# Patient Record
Sex: FEMALE | Race: WHITE | NOT HISPANIC OR LATINO | Employment: OTHER | URBAN - METROPOLITAN AREA
[De-identification: names, ages, dates, MRNs, and addresses within clinical notes are randomized per-mention and may not be internally consistent; named-entity substitution may affect disease eponyms.]

---

## 2017-02-17 ENCOUNTER — HOSPITAL ENCOUNTER (INPATIENT)
Facility: HOSPITAL | Age: 32
LOS: 4 days | Discharge: HOME | DRG: 885 | End: 2017-02-21
Attending: PSYCHIATRY & NEUROLOGY | Admitting: PSYCHIATRY & NEUROLOGY
Payer: MEDICARE

## 2017-02-17 ENCOUNTER — HOSPITAL ENCOUNTER (EMERGENCY)
Facility: HOSPITAL | Age: 32
Discharge: PSYCHIATRIC OR SUBSTANCE USE FACILITY | End: 2017-02-17
Attending: SURGERY
Payer: MEDICARE

## 2017-02-17 VITALS
WEIGHT: 95 LBS | HEART RATE: 82 BPM | BODY MASS INDEX: 19.19 KG/M2 | TEMPERATURE: 98 F | DIASTOLIC BLOOD PRESSURE: 63 MMHG | SYSTOLIC BLOOD PRESSURE: 114 MMHG | RESPIRATION RATE: 16 BRPM | OXYGEN SATURATION: 99 %

## 2017-02-17 DIAGNOSIS — F19.10 SUBSTANCE ABUSE: ICD-10-CM

## 2017-02-17 DIAGNOSIS — F29 PSYCHOSIS, UNSPECIFIED PSYCHOSIS TYPE: Primary | ICD-10-CM

## 2017-02-17 DIAGNOSIS — F41.9 ANXIETY: ICD-10-CM

## 2017-02-17 DIAGNOSIS — F29 PSYCHOSIS: ICD-10-CM

## 2017-02-17 LAB
ALBUMIN SERPL BCP-MCNC: 3.9 G/DL
ALP SERPL-CCNC: 107 U/L
ALT SERPL W/O P-5'-P-CCNC: 52 U/L
AMPHET+METHAMPHET UR QL: NORMAL
ANION GAP SERPL CALC-SCNC: 8 MMOL/L
APAP SERPL-MCNC: <3 UG/ML
AST SERPL-CCNC: 53 U/L
B-HCG UR QL: NEGATIVE
BACTERIA #/AREA URNS HPF: NORMAL /HPF
BARBITURATES UR QL SCN>200 NG/ML: NEGATIVE
BASOPHILS # BLD AUTO: 0.05 K/UL
BASOPHILS NFR BLD: 0.6 %
BENZODIAZ UR QL SCN>200 NG/ML: NEGATIVE
BILIRUB SERPL-MCNC: 0.6 MG/DL
BILIRUB UR QL STRIP: NEGATIVE
BUN SERPL-MCNC: 21 MG/DL
BZE UR QL SCN: NEGATIVE
CALCIUM SERPL-MCNC: 9.3 MG/DL
CANNABINOIDS UR QL SCN: NORMAL
CHLORIDE SERPL-SCNC: 105 MMOL/L
CLARITY UR: CLEAR
CO2 SERPL-SCNC: 26 MMOL/L
COLOR UR: YELLOW
CREAT SERPL-MCNC: 0.8 MG/DL
CREAT UR-MCNC: 31.5 MG/DL
DIFFERENTIAL METHOD: ABNORMAL
EOSINOPHIL # BLD AUTO: 0.4 K/UL
EOSINOPHIL NFR BLD: 4.6 %
ERYTHROCYTE [DISTWIDTH] IN BLOOD BY AUTOMATED COUNT: 13.3 %
EST. GFR  (AFRICAN AMERICAN): >60 ML/MIN/1.73 M^2
EST. GFR  (NON AFRICAN AMERICAN): >60 ML/MIN/1.73 M^2
ETHANOL SERPL-MCNC: <10 MG/DL
GLUCOSE SERPL-MCNC: 98 MG/DL
GLUCOSE UR QL STRIP: NEGATIVE
HCT VFR BLD AUTO: 37.1 %
HGB BLD-MCNC: 13 G/DL
HGB UR QL STRIP: ABNORMAL
KETONES UR QL STRIP: NEGATIVE
LEUKOCYTE ESTERASE UR QL STRIP: NEGATIVE
LYMPHOCYTES # BLD AUTO: 4.3 K/UL
LYMPHOCYTES NFR BLD: 50.1 %
MCH RBC QN AUTO: 31.1 PG
MCHC RBC AUTO-ENTMCNC: 35 %
MCV RBC AUTO: 89 FL
METHADONE UR QL SCN>300 NG/ML: NEGATIVE
MICROSCOPIC COMMENT: NORMAL
MONOCYTES # BLD AUTO: 1 K/UL
MONOCYTES NFR BLD: 11.6 %
NEUTROPHILS # BLD AUTO: 2.9 K/UL
NEUTROPHILS NFR BLD: 33.1 %
NITRITE UR QL STRIP: NEGATIVE
OPIATES UR QL SCN: NEGATIVE
PCP UR QL SCN>25 NG/ML: NEGATIVE
PH UR STRIP: 7 [PH] (ref 5–8)
PLATELET # BLD AUTO: 501 K/UL
PMV BLD AUTO: 9.3 FL
POTASSIUM SERPL-SCNC: 3.9 MMOL/L
PROT SERPL-MCNC: 7.6 G/DL
PROT UR QL STRIP: NEGATIVE
RBC # BLD AUTO: 4.18 M/UL
RBC #/AREA URNS HPF: 1 /HPF (ref 0–4)
SALICYLATES SERPL-MCNC: <5 MG/DL
SODIUM SERPL-SCNC: 139 MMOL/L
SP GR UR STRIP: <=1.005 (ref 1–1.03)
SQUAMOUS #/AREA URNS HPF: 3 /HPF
TOXICOLOGY INFORMATION: NORMAL
TSH SERPL DL<=0.005 MIU/L-ACNC: 0.83 UIU/ML
URN SPEC COLLECT METH UR: ABNORMAL
UROBILINOGEN UR STRIP-ACNC: NEGATIVE EU/DL
WBC # BLD AUTO: 8.61 K/UL
WBC #/AREA URNS HPF: 1 /HPF (ref 0–5)

## 2017-02-17 PROCEDURE — 27000339 *HC DAILY SUPPLY KIT

## 2017-02-17 PROCEDURE — 80329 ANALGESICS NON-OPIOID 1 OR 2: CPT

## 2017-02-17 PROCEDURE — 84443 ASSAY THYROID STIM HORMONE: CPT

## 2017-02-17 PROCEDURE — 82570 ASSAY OF URINE CREATININE: CPT

## 2017-02-17 PROCEDURE — 81000 URINALYSIS NONAUTO W/SCOPE: CPT

## 2017-02-17 PROCEDURE — 99285 EMERGENCY DEPT VISIT HI MDM: CPT

## 2017-02-17 PROCEDURE — 80307 DRUG TEST PRSMV CHEM ANLYZR: CPT | Mod: 59

## 2017-02-17 PROCEDURE — 36415 COLL VENOUS BLD VENIPUNCTURE: CPT

## 2017-02-17 PROCEDURE — 11400000 HC PSYCH PRIVATE ROOM

## 2017-02-17 PROCEDURE — 80053 COMPREHEN METABOLIC PANEL: CPT

## 2017-02-17 PROCEDURE — 80320 DRUG SCREEN QUANTALCOHOLS: CPT

## 2017-02-17 PROCEDURE — 85025 COMPLETE CBC W/AUTO DIFF WBC: CPT

## 2017-02-17 PROCEDURE — 80061 LIPID PANEL: CPT

## 2017-02-17 PROCEDURE — 81025 URINE PREGNANCY TEST: CPT

## 2017-02-17 PROCEDURE — 25000003 PHARM REV CODE 250: Performed by: PSYCHIATRY & NEUROLOGY

## 2017-02-17 RX ORDER — HYDROXYZINE PAMOATE 50 MG/1
50 CAPSULE ORAL EVERY 6 HOURS PRN
Status: DISCONTINUED | OUTPATIENT
Start: 2017-02-17 | End: 2017-02-21 | Stop reason: HOSPADM

## 2017-02-17 RX ORDER — QUETIAPINE FUMARATE 200 MG/1
400 TABLET, FILM COATED ORAL NIGHTLY
Status: DISCONTINUED | OUTPATIENT
Start: 2017-02-17 | End: 2017-02-21 | Stop reason: HOSPADM

## 2017-02-17 RX ORDER — LOPERAMIDE HYDROCHLORIDE 2 MG/1
2 CAPSULE ORAL
Status: DISCONTINUED | OUTPATIENT
Start: 2017-02-17 | End: 2017-02-21 | Stop reason: HOSPADM

## 2017-02-17 RX ORDER — ACETAMINOPHEN 325 MG/1
650 TABLET ORAL EVERY 6 HOURS PRN
Status: DISCONTINUED | OUTPATIENT
Start: 2017-02-17 | End: 2017-02-21 | Stop reason: HOSPADM

## 2017-02-17 RX ORDER — DOCUSATE SODIUM 100 MG/1
100 CAPSULE, LIQUID FILLED ORAL DAILY PRN
Status: DISCONTINUED | OUTPATIENT
Start: 2017-02-17 | End: 2017-02-21 | Stop reason: HOSPADM

## 2017-02-17 RX ORDER — MAG HYDROX/ALUMINUM HYD/SIMETH 200-200-20
30 SUSPENSION, ORAL (FINAL DOSE FORM) ORAL EVERY 6 HOURS PRN
Status: DISCONTINUED | OUTPATIENT
Start: 2017-02-17 | End: 2017-02-21 | Stop reason: HOSPADM

## 2017-02-17 RX ORDER — IBUPROFEN 200 MG
1 TABLET ORAL DAILY PRN
Status: DISCONTINUED | OUTPATIENT
Start: 2017-02-17 | End: 2017-02-21 | Stop reason: HOSPADM

## 2017-02-17 RX ORDER — OLANZAPINE 10 MG/1
10 TABLET ORAL EVERY 4 HOURS PRN
Status: DISCONTINUED | OUTPATIENT
Start: 2017-02-17 | End: 2017-02-21 | Stop reason: HOSPADM

## 2017-02-17 RX ORDER — OLANZAPINE 10 MG/2ML
10 INJECTION, POWDER, FOR SOLUTION INTRAMUSCULAR EVERY 4 HOURS PRN
Status: DISCONTINUED | OUTPATIENT
Start: 2017-02-17 | End: 2017-02-21 | Stop reason: HOSPADM

## 2017-02-17 RX ADMIN — QUETIAPINE FUMARATE 400 MG: 200 TABLET, FILM COATED ORAL at 08:02

## 2017-02-17 NOTE — ED NOTES
Transferred to [facility] via wheelchair w [facility] staff and security, condition stable, NAD.

## 2017-02-17 NOTE — PROGRESS NOTES
Staff was unable to start the Recreation Therapy Assessment at this time. Patient presents with manic behavior, hyperverbal and animated. Patient is unable to answer questions appropriately. Staff will try again when the patient is less manic.

## 2017-02-17 NOTE — IP AVS SNAPSHOT
[address] [address]  Phone: [phone]           Patient Discharge Instructions     Our goal is to set you up for success. This packet includes information on your condition, medications, and your home care. It will help you to care for yourself so you don't get sicker and need to go back to the hospital.     Please ask your nurse if you have any questions.        There are many details to remember when preparing to leave the hospital. Here is what you will need to do:    1. Take your medicine. If you are prescribed medications, review your Medication List in the following pages. You may have new medications to  at the pharmacy and others that you'll need to stop taking. Review the instructions for how and when to take your medications. Talk with your doctor or nurses if you are unsure of what to do.     2. Go to your follow-up appointments. Specific follow-up information is listed in the following pages. Your may be contacted by a transition nurse or clinical provider about future appointments. Be sure we have all of the phone numbers to reach you, if needed. Please contact your provider's office if you are unable to make an appointment.     3. Watch for warning signs. Your doctor or nurse will give you detailed warning signs to watch for and when to call for assistance. These instructions may also include educational information about your condition. If you experience any of warning signs to your health, call your doctor.               ** Verify the list of medication(s) below is accurate and up to date. Carry this with you in case of emergency. If your medications have changed, please notify your healthcare provider.             Medication List      CONTINUE taking these medications        Additional Info                      quetiapine 400 MG tablet   Commonly known as:  SEROQUEL   Quantity:  30 tablet   Refills:  1   Dose:  400 mg    Last time this was  "given:  400 mg on 2/20/2017  8:42 PM   Instructions:  Take 1 tablet (400 mg total) by mouth every evening.     Begin Date    AM    Noon    PM    Bedtime         STOP taking these medications     clonazePAM 1 MG tablet   Commonly known as:  KLONOPIN       gabapentin 300 MG capsule   Commonly known as:  NEURONTIN       hydrocodone-acetaminophen   mg per tablet   Commonly known as:  LORTAB       naproxen 500 MG tablet   Commonly known as:  NAPROSYN            Where to Get Your Medications      You can get these medications from any pharmacy     Bring a paper prescription for each of these medications     quetiapine 400 MG tablet                  Please bring to all follow up appointments:    1. A copy of your discharge instructions.  2. All medicines you are currently taking in their original bottles.  3. Identification and insurance card.    Please arrive 15 minutes ahead of scheduled appointment time.    Please call 24 hours in advance if you must reschedule your appointment and/or time.        Follow-up Information     Follow up with [facility] On 3/9/2017.    Specialties:  Psychology, Psychiatry, Behavioral Health    Why:  Outpatient Psych Services, as scheduled at     Contact information:    [address] [facility] [address]  [phone]          Discharge References/Attachments     ADDICTION, RECOVERING (ENGLISH)        Primary Diagnosis     Your primary diagnosis was:  Drug Dependence      Admission Information     Date & Time Department CSN    2/17/2017  1:37 PM [facility] [id number]       Admisson Diagnosis: Psychosis      Care Providers     Not on file      Your Vitals Were     BP Pulse Temp Resp Height Weight    105/79 (BP Location: Left arm, Patient Position: Sitting, BP Method: Automatic) 70 98.1 °F (36.7 °C) (Oral) 18 4' 11" (1.499 m) 41.3 kg (91 lb)    Last Period BMI             02/17/2017 18.38 kg/m2         Recent Lab Values     No lab values to " display.      Blood Glucose and Lipid Panel Labs        2/17/2017                          11:25 AM           Cholesterol 169           Triglycerides 36           HDL Cholesterol 66           LDL Cholesterol 95.8           HDL/Cholesterol Ratio 39.1           Total Cholesterol/HDL Ratio 2.6           Non-HDL Cholesterol 103           Comment for Cholesterol at 11:25 AM on 2/17/2017:  The National Cholesterol Education Program (NCEP) has set the  following guidelines (reference ranges) for Cholesterol:  Optimal.....................<200 mg/dL  Borderline High.............200-239 mg/dL  High........................> or = 240 mg/dL      Comment for Triglycerides at 11:25 AM on 2/17/2017:  The National Cholesterol Education Program (NCEP) has set the  following guidelines (reference values) for triglycerides:  Normal......................<150 mg/dL  Borderline High.............150-199 mg/dL  High........................200-499 mg/dL      Comment for HDL Cholesterol at 11:25 AM on 2/17/2017:  The National Cholesterol Education Program (NCEP) has set the  following guidelines (reference values) for HDL Cholesterol:  Low...............<40 mg/dL  Optimal...........>60 mg/dL      Comment for LDL Cholesterol at 11:25 AM on 2/17/2017:  The National Cholesterol Education Program (NCEP) has set the  following guidelines (reference values) for LDL Cholesterol:  Optimal.......................<130 mg/dL  Borderline High...............130-159 mg/dL  High..........................160-189 mg/dL  Very High.....................>190 mg/dL      Comment for Non-HDL Cholesterol at 11:25 AM on 2/17/2017:  Risk category and Non-HDL cholesterol goals:  Coronary heart disease (CHD)or equivalent (10-year risk of CHD >20%):  Non-HDL cholesterol goal     <130 mg/dL  Two or more CHD risk factors and 10-year risk of CHD <= 20%:  Non-HDL cholesterol goal     <160 mg/dL  0 to 1 CHD risk factor:  Non-HDL cholesterol goal     <190 mg/dL        Allergies  as of 2/21/2017     No Known Allergies      Advance Directives     An advance directive is a document which, in the event you are no longer able to make decisions for yourself, tells your healthcare team what kind of treatment you do or do not want to receive, or who you would like to make those decisions for you.  If you do not currently have an advance directive, [facility] encourages you to create one.  For more information call:  [phone]), [phone] ([phone]),  or log on to [email or web address].        Advance Directive Status          Most Recent Value    Advance Directive Status  Patient does not have Advance Directive, declines information.      Smoking Cessation     If you would like to quit smoking:   You may be eligible for free services if you are a [place] resident and started smoking cigarettes before September 1, 1988.  Call the Smoking Cessation Trust (SCT) toll free at (947) 783-5754 or (476) 142-3550.   Call 6-505-QUIT-NOW if you do not meet the above criteria.            Language Assistance Services     ATTENTION: Language assistance services are available, free of charge. Please call [phone].      ATENCIÓN: Si habla español, tiene a villa disposición servicios gratuitos de asistencia lingüística. Llame al [phone].     CHÚ Ý: N?u b?n nói Ti?ng Vi?t, có các d?ch v? h? tr? ngôn ng? mi?n phí dành cho b?n. G?i s? [phone].        National Suicide Prevention Lifeline     If you or someone you know is thinking about suicide, call the National Suicide Prevention Lifeline.  National Suicide Hotline: 7-962-256-TALK (6612)  The lifeline is free, confidential and always available.  Help a loved one, a friend or yourself.  www.suicideprevenetionlifeline.org          [facility] Sign-Up     Activating your [facility] account is as easy as 1-2-3!     1) Visit [email or web address], select Sign Up Now, enter this activation code and your date of birth, then select  Next.  [email or web address]  Expires: 2/22/2017  7:38 PM      2) Create a username and password to use when you visit [facility] in the future and select a security question in case you lose your password and select Next.    3) Enter your e-mail address and click Sign Up!    Additional Information  If you have questions, please e-mail [email or web address] or call [phone] to talk to our [facility] staff. Remember, [facility] is NOT to be used for urgent needs. For medical emergencies, dial 911.          [facility] complies with applicable Federal civil rights laws and does not discriminate on the basis of race, color, national origin, age, disability, or sex.

## 2017-02-17 NOTE — PLAN OF CARE
Problem: Patient Care Overview (Adult)  Goal: Plan of Care Review  Outcome: Ongoing (interventions implemented as appropriate)  Nutrition Recommendation/Intervention:   Continue Regular diet as tolerated encouraging good intake     Goals: adequate po intake >=75%  Nutrition Goal Status: new  Communication of RD Recs: other (comment) (note)
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
## 2017-02-17 NOTE — ED PROVIDER NOTES
"[facility]                                                         Chief Complaint  32 y.o. female with Mental Health Problem    History of Present Illness  Izabella Ellis presents to the emergency room accompanied by police because of an OPC filled out by   her mother.  Her mother states that she is demonstrating bizarre behavior.  She is throwing furniture outside.   Patient admits that she has anxiety and bipolar disorder.  She states that she put the bed frame outside so   that no one would trash the house.  She also wants everyone to leave her house because she is tired of   supporting them.  She denies pain.  Denies fever or chills.  No nausea or vomiting.  No muscle aches.    She states she is on no meds.  She states she has never been evaluated.  She denies drug use.  However   when she was later presented with the results of her UDS, she admitedt she did smoke some marijuana and   also had mainlined "something."  Denies homicidal or suicidal ideation.      Past Medical History   Diagnosis Date    Anxiety     Bipolar 1 disorder     Depression     Polycystic ovaries     Post traumatic stress disorder due to war, terrorism, or hostility      Past Surgical History   Procedure Laterality Date    Neck surgery      Back surgery      Face reconstruction      Splenectomy, total       section      Tracheostomy tube placement        Review of patient's allergies indicates:  No Known Allergies     Review of Systems and Physical Exam     Review of Systems  -- Constitution - no fever, denies fatigue, no weakness, no chills  -- Eyes - no tearing or redness, no visual disturbance  -- Ear, Nose - no tinnitus or earache, no nasal congestion or discharge  -- Mouth,Throat - no sore throat, no toothache, normal voice, normal swallowing  -- Respiratory - denies cough and congestion, no shortness of breath, no CHAVEZ  -- Cardiovascular - denies chest pain, no palpitations, denies " claudication  -- Gastrointestinal - denies abdominal pain, nausea, vomiting, or diarrhea  -- Genitourinary - no dysuria, no denies flank pain, no hematuria or frequency   -- Musculoskeletal - denies back pain, negative for myalgias and arthralgias   -- Neurological - no headache, denies weakness or seizure; no LOC  -- Skin - denies pallor, rash, or changes in skin. no hives or welts noted    Vital Signs   weight is 43.1 kg (95 lb). Her oral temperature is 98 °F (36.7 °C). Her blood pressure is 100/71 and her pulse is 94. Her respiration is 18 and oxygen saturation is 99%.      Physical Exam  -- Nursing note and vitals reviewed  -- Constitutional: Appears well-developed and well-nourished  -- Head: Atraumatic. Normocephalic. No obvious abnormality  -- Eyes: Pupils are equal and reactive to light. Normal conjunctiva and lids  -- Nose: Nose normal in appearance, nares grossly normal. No discharge  -- Throat: Mucous membranes moist, pharynx normal, normal tonsils. No lesions   -- Ears: External ears and TM normal bilaterally. Normal hearing and no drainage  -- Neck: Normal range of motion. Neck supple. No masses, trachea midline  -- Cardiac: Normal rate, regular rhythm and normal heart sounds  -- Pulmonary: Normal respiratory effort, breath sounds clear to auscultation  -- Abdominal: Soft, no tenderness. Normal bowel sounds. Normal liver edge  -- Musculoskeletal: Normal range of motion, no effusions. Joints stable   -- Neurological: No focal deficits. Showed good interaction with staff  -- Vascular: Posterior tibial, dorsalis pedis and radial pulses 2+ bilaterally    -- Lymphatics: No cervical or peripheral lymphadenopathy. No edema noted  -- Skin: Warm and dry. No evidence of rash or cellulitis.  She has healing scabs  on her face which she has medicine for but has been unable to buy it because   she is supporting everybody else.  -- Psychiatric: Agitated.  States she has never been evaluated.  Worried about her dog.       Emergency Room Course     Lab values  Labs Reviewed   CBC W/ AUTO DIFFERENTIAL - Abnormal; Notable for the following:        Result Value    MCH 31.1 (*)     Platelets 501 (*)     Gran% 33.1 (*)     Lymph% 50.1 (*)     All other components within normal limits   COMPREHENSIVE METABOLIC PANEL - Abnormal; Notable for the following:     BUN, Bld 21 (*)     AST 53 (*)     ALT 52 (*)     All other components within normal limits   URINALYSIS - Abnormal; Notable for the following:     Specific Gravity, UA <=1.005 (*)     Occult Blood UA 3+ (*)     All other components within normal limits   ACETAMINOPHEN LEVEL - Abnormal; Notable for the following:     Acetaminophen (Tylenol), Serum <3.0 (*)     All other components within normal limits   SALICYLATE LEVEL - Abnormal; Notable for the following:     Salicylate Lvl <5.0 (*)     All other components within normal limits   TSH   DRUG SCREEN PANEL, URINE EMERGENCY   ALCOHOL,MEDICAL (ETHANOL)   PREGNANCY TEST, URINE RAPID   URINALYSIS MICROSCOPIC       Medications Given  Medications - No data to display    ED Management  Medically cleared for Psychiatric evaluation and treatment    Diagnosis  -- The primary encounter diagnosis was Psychosis, unspecified psychosis type. Diagnoses of Substance abuse and Anxiety were also pertinent to this visit.    Disposition and Plan  -- Disposition: Transfer for psychiatric evaluation and treatment.  -- Condition: stable         Ceci Cortez MD  02/17/17 [id number]

## 2017-02-17 NOTE — CONSULTS
[facility]  Adult Nutrition  Consult Note    SUMMARY     Recommendations    Recommendation/Intervention: Continue Regular diet as tolerated encouraging good intake  Goals: adequate po intake >=75%  Nutrition Goal Status: new  Communication of RD Recs: other (comment) (note)    Continuum of Care Plan    Referral to Outpatient Services: behavioral health clinic    Reason for Assessment    Reason for Assessment: physician consult  Diagnosis: other (see comments) (Psyc)  Relevent Medical History: All Psyc         General Information Comments: pt soker, recovering addict; multiple scabs on body    Nutrition Prescription Ordered    Current Diet Order: Regular  Nutrition Order Comments: adequate      Evaluation of Received Nutrients/Fluid Intake  Tolerance: tolerating     Nutrition Risk Screen     Nutrition Risk Screen: no indicators present    Nutrition/Diet History  Factors Affecting Nutritional Intake: socio-economic (drug use)    Labs/Tests/Procedures/Meds       Pertinent Labs Reviewed: reviewed  Pertinent Labs Comments: BUN 21H, AST 53H, ALT 52H  Pertinent Medications Reviewed: reviewed  Pertinent Medications Comments: MVI    Physical Findings    Overall Physical Appearance:  (unable to obtain)  Tubes:  (N/A)  Oral/Mouth Cavity:  (Unable to obtain)  Skin: other (see comments) (scabs on body)    Anthropometrics       Height (inches): 59.02 in  Weight Method: Standard Scale  Weight (kg): 40.4 kg     Ideal Body Weight (IBW), Female: 95.1 lb     % Ideal Body Weight, Female (lb): 93.66 lb  BMI (kg/m2): 17.98  BMI Grade: 17 - 18.4 protein-energy malnutrition grade I    Estimated/Assessed Needs    Weight Used For Calorie Calculations: 43 kg (94 lb 12.8 oz) (IBW)   Height (cm): 149.9 cm     Energy Need Method: Kcal/kg     25 kcal/kg (kcal): 1075 and 30 kcal/kg (kcal): 1290   RMR (Elk-St. Jeor Equation): 1026.95        Weight Used For Protein Calculations: 43 kg (94 lb 12.8 oz) (IBW)  Protein  Requirements: 35-43 (0.8-1.0)  0.8 gm Protein (gm): 34.47 and 1.0 gm Protein (gm): 43.09  Fluid Need Method: RDA Method 1ml/kcal    Nutrition Diagnosis    No nutritional diagnosis at this time    Monitor and Evaluation    Food and Nutrient Intake: food and beverage intake  Food and Nutrient Adminstration: diet order  Knowledge/Beliefs/Attitudes: food and nutrition knowledge/skill, beliefs and attitudes  Physical Activity and Function: nutrition-related ADLs and IADLs  Anthropometric Measurements: weight, weight change  Biochemical Data, Medical Tests and Procedures: electrolyte and renal panel  Nutrition-Focused Physical Findings: overall appearance    Nutrition Risk    Level of Risk: low    Nutrition Follow-Up    RD Follow-up?: Yes 02/21/2017    Assessment and Plan    No new Assessment & Plan notes have been filed under this hospital service since the last note was generated.  Service: Nutrition

## 2017-02-18 PROCEDURE — 25000003 PHARM REV CODE 250: Performed by: PSYCHIATRY & NEUROLOGY

## 2017-02-18 PROCEDURE — 90833 PSYTX W PT W E/M 30 MIN: CPT | Mod: ,,, | Performed by: PSYCHIATRY & NEUROLOGY

## 2017-02-18 PROCEDURE — 99223 1ST HOSP IP/OBS HIGH 75: CPT | Mod: ,,, | Performed by: PSYCHIATRY & NEUROLOGY

## 2017-02-18 PROCEDURE — 99221 1ST HOSP IP/OBS SF/LOW 40: CPT | Mod: ,,, | Performed by: FAMILY MEDICINE

## 2017-02-18 PROCEDURE — 27000339 *HC DAILY SUPPLY KIT

## 2017-02-18 PROCEDURE — 11400000 HC PSYCH PRIVATE ROOM

## 2017-02-18 RX ADMIN — THERA TABS 1 TABLET: TAB at 08:02

## 2017-02-18 RX ADMIN — QUETIAPINE FUMARATE 400 MG: 200 TABLET, FILM COATED ORAL at 08:02
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
## 2017-02-18 NOTE — CONSULTS
History & Physical      SUBJECTIVE:     History of Present Illness:  Patient is a 32 y.o. female presents with depression and substance abuse. Admitted to [facility].    No past medical history other than psych. No complaints today.    PTA Medications   Medication Sig    quetiapine (SEROQUEL) 400 MG tablet Take 400 mg by mouth every evening.    [DISCONTINUED] clonazePAM (KLONOPIN) 1 MG tablet Take 1 mg by mouth 2 (two) times daily as needed for Anxiety.    [DISCONTINUED] gabapentin (NEURONTIN) 300 MG capsule Take 1 capsule (300 mg total) by mouth 3 (three) times daily.    [DISCONTINUED] hydrocodone-acetaminophen (LORTAB)  mg per tablet Take 1 tablet by mouth every 6 (six) hours as needed.      [DISCONTINUED] naproxen (NAPROSYN) 500 MG tablet Take 1 tablet (500 mg total) by mouth 2 (two) times daily with meals.       Review of patient's allergies indicates:  No Known Allergies    Past Medical History   Diagnosis Date    Anxiety     Bipolar 1 disorder     Depression     Hx of psychiatric care     Marisel     Polycystic ovaries     Post traumatic stress disorder due to war, terrorism, or hostility      Past Surgical History   Procedure Laterality Date    Neck surgery      Back surgery      Face reconstruction      Splenectomy, total       section      Tracheostomy tube placement       Family History   Problem Relation Age of Onset    Anxiety disorder Mother     Depression Mother     Depression Maternal Grandmother     Anxiety disorder Maternal Grandmother     Depression Sister     Anxiety disorder Sister      Social History   Substance Use Topics    Smoking status: Current Every Day Smoker     Packs/day: 0.50     Types: Cigarettes    Smokeless tobacco: Never Used      Comment: not interested    Alcohol use No        Review of Systems:  Constitutional: no fever or chills  Respiratory: no cough or shorness of breath  Cardiovascular: no chest pain or palpitations  Gastrointestinal: no  nausea or vomiting, no abdominal pain or change in bowel habits  Musculoskeletal: no arthralgias or myalgias    OBJECTIVE:     Vital Signs (Most Recent)  Temp: 98.7 °F (37.1 °C) (02/18/17 1500)  Pulse: 77 (02/18/17 1500)  Resp: 18 (02/18/17 1500)  BP: (!) 114/54 (02/18/17 1500)    Physical Exam:  General: well developed, well nourished  Lungs:  clear to auscultation bilaterally and normal respiratory effort  Cardiovascular: Heart: regular rate and rhythm, S1, S2 normal, no murmur, click, rub or gallop. Chest Wall: no tenderness. Extremities: no cyanosis or edema, or clubbing. Pulses: 2+ and symmetric.  Abdomen/Rectal: Abdomen: soft, non-tender non-distented; bowel sounds normal; no masses,  no organomegaly. Rectal: not examined  Skin: Skin color, texture, turgor normal. No rashes or lesions  Musculoskeletal:normal gait  Psych:   Neuro: Cranial nerves:  CN II Visual fields full to confrontation.   CN III, IV, VI Pupils are equal, round, and reactive to light.  CN III: no palsy  Nystagmus: none   Diplopia: none  Ophthalmoparesis: none  CN V Facial sensation intact.   CN VII Facial expression full, symmetric.   CN VIII normal.   CN IX normal.   CN X normal.   CN XI normal.   CN XII normal.      Laboratory  CBC:   Recent Labs  Lab 02/17/17  1127   WBC 8.61   RBC 4.18   HGB 13.0   HCT 37.1   *   MCV 89   MCH 31.1*   MCHC 35.0     CMP:   Recent Labs  Lab 02/17/17  1127   GLU 98   CALCIUM 9.3   ALBUMIN 3.9   PROT 7.6      K 3.9   CO2 26      BUN 21*   CREATININE 0.8   ALKPHOS 107   ALT 52*   AST 53*   BILITOT 0.6       Recent Labs  Lab 02/17/17  1115   COLORU Yellow   SPECGRAV <=1.005*   PHUR 7.0   PROTEINUA Negative   BACTERIA Rare   NITRITE Negative   LEUKOCYTESUR Negative   UROBILINOGEN Negative     TSH   Date Value Ref Range Status   02/17/2017 0.827 0.400 - 4.000 uIU/mL Final     No results found for this or any previous visit.  Alcohol, Medical, Serum   Date Value Ref Range Status   02/17/2017 <10  <10 mg/dL Final     Acetaminophen (Tylenol), Serum   Date Value Ref Range Status   02/17/2017 <3.0 (L) 10.0 - 20.0 ug/mL Final     Comment:     Toxic Levels:  Adults (4 hr post-ingestion).........>150 ug/mL  Adults (12 hr post-ingestion)........>40 ug/mL  Peds (2 hr post-ingestion, liquid)...>225 ug/mL       Results for orders placed or performed during the hospital encounter of 02/17/17   Salicylate level   Result Value Ref Range    Salicylate Lvl <5.0 (L) 15.0 - 30.0 mg/dL     Results for orders placed or performed during the hospital encounter of 02/17/17   Drug screen panel, emergency   Result Value Ref Range    Benzodiazepines Negative     Methadone metabolites Negative     Cocaine (Metab.) Negative     Opiate Scrn, Ur Negative     Barbiturate Screen, Ur Negative     Amphetamine Screen, Ur Presumptive Positive     THC Presumptive Positive     Phencyclidine Negative     Creatinine, Random Ur 31.5 15.0 - 325.0 mg/dL    Toxicology Information SEE COMMENT      Preg Test, Ur   Date Value Ref Range Status   02/17/2017 Negative  Final       ASSESSMENT/PLAN:     Active Hospital Problems    Diagnosis  POA    Psychosis [F29]  Yes      Resolved Hospital Problems    Diagnosis Date Resolved POA   No resolved problems to display.       Plan: Further orders for psych

## 2017-02-18 NOTE — PLAN OF CARE
Problem: Patient Care Overview (Adult)  Goal: Plan of Care Review  Outcome: Ongoing (interventions implemented as appropriate)  Pt has slept 7.5 hours so far with 2 brief interruptions.  Pt is sleeping at this time.  NAD.  Resp even & unlabored.  Pathways clear and bed is low.  Q 15 minute safety checks ongoing.  All precautions maintained.

## 2017-02-18 NOTE — PLAN OF CARE
Problem: Patient Care Overview (Adult)  Goal: Plan of Care Review  Outcome: Ongoing (interventions implemented as appropriate)  Pt mostly in dayroom.  Hyperverbal, restless.  Focused on discharge.  Pt states that she doesn't belong here and her aunt put her here. Pt blaming aunt for circumstances surrounding pt's admission to [facility].  Pt needs frequent redirection but overall is cooperative.  All precautions maintained.  Safety rounds ongoing.
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
## 2017-02-19 PROCEDURE — 27000339 *HC DAILY SUPPLY KIT

## 2017-02-19 PROCEDURE — 99233 SBSQ HOSP IP/OBS HIGH 50: CPT | Mod: ,,, | Performed by: PSYCHIATRY & NEUROLOGY

## 2017-02-19 PROCEDURE — 11400000 HC PSYCH PRIVATE ROOM

## 2017-02-19 PROCEDURE — 25000003 PHARM REV CODE 250: Performed by: PSYCHIATRY & NEUROLOGY

## 2017-02-19 RX ADMIN — THERA TABS 1 TABLET: TAB at 08:02

## 2017-02-19 RX ADMIN — QUETIAPINE FUMARATE 400 MG: 200 TABLET, FILM COATED ORAL at 08:02

## 2017-02-19 NOTE — PLAN OF CARE
Problem: Patient Care Overview (Adult)  Goal: Plan of Care Review  Outcome: Ongoing (interventions implemented as appropriate)  Pt is sleeping at the present time and has slept 9.5 hours with one brief interruption so far.  NAD.  Resp even & unlabored.  Pathways clear and bed is low.  Q 15 minute safety checks ongoing.  All precautions maintained.

## 2017-02-19 NOTE — PLAN OF CARE
Problem: Patient Care Overview (Adult)  Goal: Plan of Care Review  Outcome: Ongoing (interventions implemented as appropriate)  Shift note : patient was asked , what is your goal for being here ? And she replied , i should not be here in the first place , my aunt is the crazy one . So my goal is to pray for her and not be mean hearted about it . Patient is eating all meals , taking all ordered medications and inter acting with peers and staff

## 2017-02-19 NOTE — PLAN OF CARE
Problem: Patient Care Overview (Adult)  Goal: Plan of Care Review  Outcome: Ongoing (interventions implemented as appropriate)  Pt out of assigned room this evening.  Withdrawn, quiet.  No interaction with peers but did watch TV with peers.  Compliant with meds.  Pathways clear and bed is low.  Q 15 minute safety checks ongoing.  All precautions maintained.

## 2017-02-19 NOTE — PROGRESS NOTES
PSYCHIATRY DAILY INPATIENT PROGRESS NOTE  SUBSEQUENT HOSPITAL VISIT    ENCOUNTER DATE: 2/19/2017  SITE: [facility]    DATE OF ADMISSION: 2/17/2017  1:37 PM  LENGTH OF STAY: 2 days      HISTORY    CHIEF COMPLAINT   Izabella Ellis is a 32 y.o. female, seen during daily motta rounds on the inpatient unit.  Izabella Ellis presents with the chief complaint of mood disorder- OPC'd for anger    HPI   (Elements: Location, Quality, Severity, Duration, Timing, Content, Modifying Factors, Associated Signs & Symptoms)    The patient was seen and examined. The chart was reviewed.    Staff reports no behavioral or management issues.     The patient has been compliant with treatment. The patient denied any side effects.    She maintains that she does not have a substance use problems, and her family members are the ones with psychiatric issues that are unmanaged.     Denied Symptoms of Depression: no diminished mood or loss of interest/anhedonia; no irritability, diminished energy, change in sleep, change in appetite, diminished concentration or cognition or indecisiveness, PMA/R, excessive guilt or hopelessness or worthlessness, or suicidal ideations     Improving issues with Sleep: no initiation, maintenance, or early morning awakening with inability to return to sleep; controlled with seroquel     Denied Suicidal/Homicidal ideations: no active/passive ideations, organized plans, or future intentions     Denied Symptoms of psychosis: no hallucinations, delusions, disorganized thinking, disorganized behavior or abnormal motor behavior, or negative symptoms      Improving Symptoms of loli or hypomania: less elevated, expansive, or irritable mood with less  increased energy or activity; with no inflated self-esteem or grandiosity, less decreased need for sleep, less increased rate of speech, less FOI or racing thoughts, less distractibility, less increased goal directed activity or PMA, or less risky/disinhibited  "behavior; reports a history of marisel which may be true marisel vs cluster B pathology vs substance induced or some combination of the 3    No s/s of substance withdrawal.             ROS  General ROS: negative  Ophthalmic ROS: negative  ENT ROS: negative  Allergy and Immunology ROS: negative  Hematological and Lymphatic ROS: negative  Endocrine ROS: negative  Respiratory ROS: no cough, shortness of breath, or wheezing  Cardiovascular ROS: no chest pain or dyspnea on exertion  Gastrointestinal ROS: no abdominal pain, change in bowel habits, or black or bloody stools  Genito-Urinary ROS: no dysuria, trouble voiding, or hematuria  Musculoskeletal ROS: negative  Neurological ROS: no TIA or stroke symptoms  Dermatological ROS: negative    PAST MEDICAL HISTORY   Past Medical History   Diagnosis Date    Anxiety     Bipolar 1 disorder     Depression     Hx of psychiatric care     Marisel     Polycystic ovaries     Post traumatic stress disorder due to war, terrorism, or hostility            PSYCHOTROPIC MEDICATIONS   Scheduled Meds:   multivitamin  1 tablet Oral Daily    quetiapine  400 mg Oral QHS     Continuous Infusions:   PRN Meds:.acetaminophen, aluminum-magnesium hydroxide-simethicone, docusate sodium, hydrOXYzine pamoate, loperamide, nicotine, olanzapine **AND** olanzapine        EXAMINATION    VITALS   Vitals:    02/18/17 2100   BP: (!) 95/52   Pulse: 78   Resp: 18   Temp: 97.7 °F (36.5 °C)       CONSTITUTIONAL  General Appearance: WF, heavily tattooed including facial tattoos; NAD     MUSCULOSKELETAL  Muscle Strength and Tone: normal  Abnormal Involuntary Movements: none  Gait and Station: normal; non-ataxic     PSYCHIATRIC   Level of Consciousness: awake, alert  Orientation: p/p/t/s  Grooming: poor, malodorous; adequate to - improved mildly from yesterday   Psychomotor Behavior: less PMA, no PMR  Speech: increased r/t/v/s  Language: English fluent  Mood: "fine"  Affect: less elevated/irritable  Thought " Process: derailed but redirectable; improving  Associations: intact; no TOM  Thought Content: denied AVH/delusions; denied HI/SI  Memory: intact to recent and remote events  Attention: intact to conversation but somewhat distractible   Fund of Knowledge: age and education appropriate  Estimate if Intelligence: below average based on work/education history, vocabulary and mental status exam  Insight: fair- recognizes mood symptoms but minimizes substance use   Judgment: good- no bx issues, compliant and cooperative      DIAGNOSTIC TESTING   Laboratory Results  No results found for this or any previous visit (from the past 24 hour(s)).      ASSESSMENT      IMPRESSION   Unspecified Mood Disorder   Unspecified Anxiety disorder     Methamphetamine Use Disorder (unable to determine or specify further)  Cannabis Use Disorder (unable to determine or specify further)  Nicotine Dependence           MEDICAL DECISION MAKING         PROBLEM LIST AND MANAGEMENT PLANS   Mood  Anxiety  Substance/nicotine use      PRESCRIPTION DRUG MANAGEMENT  Compliance: yes  Side Effects: no  Regimen Adjustments:   Seroquel 400 mg po q HS for mood and off label anxiety    Counseled on Substance/nicotine use- patient is not interested in pharmacotherapy or inpatient/outpatient tx at this time     DIAGNOSTIC TESTING  Labs reviewed     Disposition:  -SW to assist with aftercare planning and collateral  -Once stable discharge home with outpatient follow up care and/or rehab  -Continue inpatient treatment under a PEC and/or CEC for danger to self, danger to others, and grave disability as evident by severe mood disorder with substance use and recent erratic and angry bx with diminished ADLs.     NEED FOR CONTINUED HOSPITALIZATION  Psychiatric illness continues to pose a potential threat to life or bodily function, of self or others, thereby requiring the need for continued inpatient psychiatric hospitalization: Yes    Protective inpatient pyschiatric  hospitalization required while a safe disposition plan is enacted: Yes    Patient stabilized and ready for discharge from inpatient psychiatric unit: No      STAFF:   Nick Escalante MD  Psychiatry

## 2017-02-20 LAB
CHOLEST/HDLC SERPL: 2.6 {RATIO}
HDL/CHOLESTEROL RATIO: 39.1 %
HDLC SERPL-MCNC: 169 MG/DL
HDLC SERPL-MCNC: 66 MG/DL
LDLC SERPL CALC-MCNC: 95.8 MG/DL
NONHDLC SERPL-MCNC: 103 MG/DL
TRIGL SERPL-MCNC: 36 MG/DL

## 2017-02-20 PROCEDURE — 90833 PSYTX W PT W E/M 30 MIN: CPT | Mod: ,,, | Performed by: PSYCHIATRY & NEUROLOGY

## 2017-02-20 PROCEDURE — 11400000 HC PSYCH PRIVATE ROOM

## 2017-02-20 PROCEDURE — 36415 COLL VENOUS BLD VENIPUNCTURE: CPT

## 2017-02-20 PROCEDURE — 25000003 PHARM REV CODE 250: Performed by: PSYCHIATRY & NEUROLOGY

## 2017-02-20 PROCEDURE — 99233 SBSQ HOSP IP/OBS HIGH 50: CPT | Mod: ,,, | Performed by: PSYCHIATRY & NEUROLOGY

## 2017-02-20 PROCEDURE — 27000339 *HC DAILY SUPPLY KIT

## 2017-02-20 RX ADMIN — QUETIAPINE FUMARATE 400 MG: 200 TABLET, FILM COATED ORAL at 08:02

## 2017-02-20 RX ADMIN — THERA TABS 1 TABLET: TAB at 08:02

## 2017-02-20 NOTE — PROGRESS NOTES
PSYCHIATRY DAILY INPATIENT PROGRESS NOTE  SUBSEQUENT HOSPITAL VISIT    ENCOUNTER DATE: 2/20/2017  SITE: [facility]    DATE OF ADMISSION: 2/17/2017  1:37 PM  LENGTH OF STAY: 3 days      HISTORY    CHIEF COMPLAINT   Izabella Ellis is a 32 y.o. female, seen during daily motta rounds on the inpatient unit.  Izabella Ellis presents with the chief complaint of mood disorder- OPC'd for anger    HPI   (Elements: Location, Quality, Severity, Duration, Timing, Content, Modifying Factors, Associated Signs & Symptoms)    The patient was seen and examined. The chart was reviewed.    Staff reports no behavioral or management issues.     The patient has been compliant with treatment. The patient denied any side effects.    She maintains that she does not have a substance use problems, and her family members are the ones with psychiatric issues that are unmanaged.     Denied Symptoms of Depression: no diminished mood or loss of interest/anhedonia; no irritability, diminished energy, change in sleep, change in appetite, diminished concentration or cognition or indecisiveness, PMA/R, excessive guilt or hopelessness or worthlessness, or suicidal ideations     Improving issues with Sleep: no initiation, maintenance, or early morning awakening with inability to return to sleep; controlled with seroquel     Denied Suicidal/Homicidal ideations: no active/passive ideations, organized plans, or future intentions     Denied Symptoms of psychosis: no hallucinations, delusions, disorganized thinking, disorganized behavior or abnormal motor behavior, or negative symptoms      Improving Symptoms of loli or hypomania: less elevated, expansive, or irritable mood with less  increased energy or activity; with no inflated self-esteem or grandiosity, less decreased need for sleep, less increased rate of speech, less FOI or racing thoughts, less distractibility, less increased goal directed activity or PMA, or less risky/disinhibited  behavior; reports a history of marisel which may be true marisel vs cluster B pathology vs substance induced or some combination of the 3    No s/s of substance withdrawal.     We discussed her family stressors with her aunt and her ex as well as custody issues.       PSYCHOTHERAPY ADD-ON [id number]   30 (16-37*) minutes      Time: 16 minutes  Participants: Met with patient    Therapeutic Intervention Type: behavior modifying psychotherapy, supportive psychotherapy  Why chosen therapy is appropriate versus another modality: relevant to diagnosis, patient responds to this modality, evidence based practice    Target symptoms: substance abuse, mood disorder  Primary focus: substance use  Psychotherapeutic techniques: supportive, behavioral and motivational techniques; psycho-education    Outcome monitoring methods: self-report, observation    Patient's response to intervention:  The patient's response to intervention is accepting.    Progress toward goals:  The patient's progress toward goals is fair .            ROS  General ROS: negative  Ophthalmic ROS: negative  ENT ROS: negative  Allergy and Immunology ROS: negative  Hematological and Lymphatic ROS: negative  Endocrine ROS: negative  Respiratory ROS: no cough, shortness of breath, or wheezing  Cardiovascular ROS: no chest pain or dyspnea on exertion  Gastrointestinal ROS: no abdominal pain, change in bowel habits, or black or bloody stools  Genito-Urinary ROS: no dysuria, trouble voiding, or hematuria  Musculoskeletal ROS: negative  Neurological ROS: no TIA or stroke symptoms  Dermatological ROS: negative    PAST MEDICAL HISTORY   Past Medical History   Diagnosis Date    Anxiety     Bipolar 1 disorder     Depression     Hx of psychiatric care      Zydis 10mg; Vistaril 50mg; Prozac 20mg via Dr. Boss outpt.8/23/2012 - Klonopin 0.5mg added 10.24.2012    Marisel     Polycystic ovaries     Post traumatic stress disorder due to war, terrorism, or hostility      "Psychiatric problem      anxiety           PSYCHOTROPIC MEDICATIONS   Scheduled Meds:   multivitamin  1 tablet Oral Daily    quetiapine  400 mg Oral QHS     Continuous Infusions:   PRN Meds:.acetaminophen, aluminum-magnesium hydroxide-simethicone, docusate sodium, hydrOXYzine pamoate, loperamide, nicotine, olanzapine **AND** olanzapine        EXAMINATION    VITALS   Vitals:    02/20/17 0758   BP: 117/76   Pulse: 73   Resp: 16   Temp: 96.2 °F (35.7 °C)       CONSTITUTIONAL  General Appearance: WF, heavily tattooed including facial tattoos; NAD     MUSCULOSKELETAL  Muscle Strength and Tone: normal  Abnormal Involuntary Movements: none  Gait and Station: normal; non-ataxic     PSYCHIATRIC   Level of Consciousness: awake, alert  Orientation: p/p/t/s  Grooming: improving   Psychomotor Behavior: no PMA, no PMR  Speech: increased r/t/v/s  Language: English fluent  Mood: "fine"  Affect: less elevated/irritable; improving  Thought Process: derailed but redirectable; improving  Associations: intact; no TOM  Thought Content: denied AVH/delusions; denied HI/SI  Memory: intact to recent and remote events  Attention: intact to conversation but somewhat distractible   Fund of Knowledge: age and education appropriate  Estimate if Intelligence: below average based on work/education history, vocabulary and mental status exam  Insight: good- recognizes mood symptoms but minimizes substance use   Judgment: good- no bx issues, compliant and cooperative      DIAGNOSTIC TESTING   Laboratory Results  No results found for this or any previous visit (from the past 24 hour(s)).      ASSESSMENT      IMPRESSION   Unspecified Mood Disorder   Unspecified Anxiety disorder     Methamphetamine Use Disorder (unable to determine or specify further)  Cannabis Use Disorder (unable to determine or specify further)  Nicotine Dependence           MEDICAL DECISION MAKING         PROBLEM LIST AND MANAGEMENT PLANS   Mood  Anxiety  Substance/nicotine use  "     PRESCRIPTION DRUG MANAGEMENT  Compliance: yes  Side Effects: no  Regimen Adjustments:   Seroquel 400 mg po q HS for mood and off label anxiety    Counseled on Substance/nicotine use- patient is not interested in pharmacotherapy or inpatient/outpatient tx at this time     DIAGNOSTIC TESTING  Labs reviewed     Disposition:  -SW to assist with aftercare planning and collateral  -Once stable discharge home with outpatient follow up care and/or rehab  -Continue inpatient treatment under a PEC and/or CEC for danger to self, danger to others, and grave disability as evident by severe mood disorder with substance use and recent erratic and angry bx with diminished ADLs. Patient is improving and will be discharged home tomorrow if stable    NEED FOR CONTINUED HOSPITALIZATION  Psychiatric illness continues to pose a potential threat to life or bodily function, of self or others, thereby requiring the need for continued inpatient psychiatric hospitalization: Yes    Protective inpatient pyschiatric hospitalization required while a safe disposition plan is enacted: Yes    Patient stabilized and ready for discharge from inpatient psychiatric unit: No      STAFF:   Nick Escalante MD  Psychiatry

## 2017-02-20 NOTE — PSYCH
"LCSW Admit Note:    Pt is a 31 y/o female admitted for psychiatric evaluation upon her mother executing an OPC which documented that the patient was acting "bizarre - throwing furniture out of the house. Pt had a positive UTOX for amphetamines and marijuana. Pt admits to having a substance abuse history of "heroin and meth". Pt states she has been clean for 1.5 years. Pt admits to daily use of marijuana and states her "step-aunt made me do meth". Nursing staff noted severl "scabs over entire body" which may be related to methamphetamine use. Pt describes herself as a "recovering addict".     Pt denies SI/HI  - no plan or intent - and states she has had no prior psychiatric hospitalizations. Pt has been treated via outpt/PCP for anxiety and mood disorder. Past medications include Zydis, Vistaril, Prozac and Klonopin. Pt denied being on psychotropic medications upon admit. Pt's affect manic with congruent mood. Speech noted to be rapid/pressured. No evidence of psychosis - no A/V hallucinations.     Pt is single, never  and identifies as lesbian. Pt has two children she hasn't "seen in three years". Pt educated to 7th grade and receives disability/SSI. Pt resides in her own home in [place], [place]. Pt's initial discharge plan will be to return home upon stabilization and follow-up with [facility].    "

## 2017-02-20 NOTE — PROGRESS NOTES
"Group Note:    The patient was prompted to attend group, but she said "do I have to go? I don't want to." Counselor will continue to encourage patient to engage in therapeutic milieu.  "

## 2017-02-20 NOTE — PLAN OF CARE
Problem: Patient Care Overview (Adult)  Goal: Plan of Care Review  Outcome: Ongoing (interventions implemented as appropriate)  Encouraged drug and alcohol free lifestyle. Denies suicidal ideation with no evidence of self harming behavior noted. Improved mood and behavior. Noted to be up in dayroom interacting with select peers. Quiet with poverty of speech and content. Accepting meals and meds as ordered. Discussed healthy coping skills and techniques. Discussed triggers to substance abuse and motivators to use. Reinforcement needed. No falls; gait steady.

## 2017-02-20 NOTE — PLAN OF CARE
Problem: Patient Care Overview (Adult)  Goal: Plan of Care Review  Outcome: Ongoing (interventions implemented as appropriate)  Pt has slept 9 hours with one interruption so far.  Pt is sleeping at this time.  NAD.  Resp even & unlabored.  Pathways clear and bed is low.  Q 15 minute safety checks ongoing. All precautions maintained.
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
## 2017-02-20 NOTE — PLAN OF CARE
Problem: Patient Care Overview (Adult)  Goal: Discharge Needs Assessment  Outcome: Ongoing (interventions implemented as appropriate)  Pt initial D/C plan will be to home and follow-up with [facility] for outpatient mental health.

## 2017-02-21 VITALS
HEART RATE: 70 BPM | RESPIRATION RATE: 18 BRPM | WEIGHT: 91 LBS | HEIGHT: 59 IN | TEMPERATURE: 98 F | DIASTOLIC BLOOD PRESSURE: 79 MMHG | BODY MASS INDEX: 18.35 KG/M2 | SYSTOLIC BLOOD PRESSURE: 105 MMHG

## 2017-02-21 PROBLEM — F15.20 AMPHETAMINE DEPENDENCE: Status: ACTIVE | Noted: 2017-02-21

## 2017-02-21 PROBLEM — F29 PSYCHOSIS: Status: RESOLVED | Noted: 2017-02-17 | Resolved: 2017-02-21

## 2017-02-21 PROCEDURE — 25000003 PHARM REV CODE 250: Performed by: PSYCHIATRY & NEUROLOGY

## 2017-02-21 PROCEDURE — 99239 HOSP IP/OBS DSCHRG MGMT >30: CPT | Mod: S$PBB,,, | Performed by: PSYCHIATRY & NEUROLOGY

## 2017-02-21 RX ORDER — QUETIAPINE FUMARATE 400 MG/1
400 TABLET, FILM COATED ORAL NIGHTLY
Qty: 30 TABLET | Refills: 1 | Status: SHIPPED | OUTPATIENT
Start: 2017-02-21 | End: 2017-09-26

## 2017-02-21 RX ADMIN — THERA TABS 1 TABLET: TAB at 08:02

## 2017-02-21 NOTE — PLAN OF CARE
Problem: Patient Care Overview (Adult)  Goal: Plan of Care Review  Outcome: Ongoing (interventions implemented as appropriate)  Pt in day room most of shift, interacting with peers and staff. Pt friendly, talkative, bright affect, smiling. Showered. Medication compliant.

## 2017-02-21 NOTE — PSYCH
Order for discharge received.Discharge instructions explained to pt an voiced an understanding.All discharge medications were reviewed with pt at time of discharge.Discharge medications list were forwarded to the next level of care at time of discharge.Pt calm,cooperative,no si/hi or psychosis.Discharge instructions explained to pt and voiced an understanding.Family will provide ride.

## 2017-02-21 NOTE — DISCHARGE SUMMARY
"Discharge Summary  Psychiatry    Admit Date: 2/17/2017    Discharge Date and Time:  02/21/2017 9:59 AM    Attending Physician: Nick Escalante MD     Discharge Provider: Nick Escalante MD    Reason for Admission:  mood disorder- OPC'd for anger    History of Present Illness:   The patient presented to the ER on 2/17/17 under an OPC filed by her mother for anger issues. Her mother stated that the patient has been acting bizarre, throwing furniture out of the house and trying to evict everyone form the home. Reportedly, she has a history if bipolar disorder and anxiety and has not been on medications.      The patient was medically cleared and admitted to the U. AT the time of admit, she was noted to be hyper-verbal and highly animated with poor H/G and a poor historian. upset with "step aunt", stating "she made me do meth, when I get out of here, I'm reporting her to drug court. Shereports a history of meth and opiate addiction.      Her Utox was positive for amphetamines and THC.      The patient reports that she is here for no reason. Her family had her hospitalized after they got her to do "a line" then messed up her house and blamed it on her. She denied any psychiatric histories. However, she does still exhibit s/s of loli and anxiety, which may be primary mood/anxiety disorders vs meth induced.      Denied Symptoms of Depression: no diminished mood or loss of interest/anhedonia; no irritability, diminished energy, change in sleep, change in appetite, diminished concentration or cognition or indecisiveness, PMA/R, excessive guilt or hopelessness or worthlessness, or suicidal ideations     Improving issues with Sleep: no initiation, maintenance, or early morning awakening with inability to return to sleep; controlled with seroquel     Denied Suicidal/Homicidal ideations: no active/passive ideations, organized plans, or future intentions     Denied Symptoms of psychosis: no hallucinations, " delusions, disorganized thinking, disorganized behavior or abnormal motor behavior, or negative symptoms      Improving Symptoms of loli or hypomania: less elevated, expansive, or irritable mood with less  increased energy or activity; with no inflated self-esteem or grandiosity, less decreased need for sleep, +increased rate of speech, +FOI or racing thoughts, less distractibility, less increased goal directed activity or PMA, or less risky/disinhibited behavior; reports a history of loli which may be true loli vs cluster B pathology vs substance induced or some combination of the 3     Denied Symptoms of MARTI: no excessive anxiety/worry/fear     Denied Symptoms of Panic Disorder: no recurrent panic attacks; without agoraphobia     Denied Symptoms of PTSD: +h/o trauma; occasional re-experiencing/intrusive symptoms; denied avoidant behavior, negative alterations in cognition or mood, or hyperarousal symptoms; without dissociative symptoms; h/o PTSD per patient      Denied Symptoms of OCD: no obsessions or compulsions      Denied Symptoms of Eating Disorders: no anorexia, bulimia or binging     +Substance Use: positive for intoxication, withdrawal, tolerance, used in larger amounts or duration than intended, unsuccessful attempts to limit or quit, increased time engaging in or seeking out, cravings or strong desire to use, failure to fulfill obligations, negative consequences in social/interpersonal/occupational,/recreational areas, use in dangerous situations, and medical/psychological consequences        Procedures Performed: * No surgery found *    Hospital Course (synopsis of major diagnoses, care, treatment, and services provided during the course of the hospital stay):   The patient was stabilized and discharged on the following medications:  Seroquel 400 mg po q HS for mood and off label anxiety     Counseled on Substance/nicotine use- patient is not interested in pharmacotherapy or inpatient/outpatient tx  at this time    The patient was compliant with treatment. The patient denied any side effects.     Denied Symptoms of Depression: no diminished mood or loss of interest/anhedonia; no irritability, diminished energy, change in sleep, change in appetite, diminished concentration or cognition or indecisiveness, PMA/R, excessive guilt or hopelessness or worthlessness, or suicidal ideations      Improved issues with Sleep: no initiation, maintenance, or early morning awakening with inability to return to sleep; controlled with seroquel      Denied Suicidal/Homicidal ideations: no active/passive ideations, organized plans, or future intentions      Denied Symptoms of psychosis: no hallucinations, delusions, disorganized thinking, disorganized behavior or abnormal motor behavior, or negative symptoms       Improved Symptoms of loli or hypomania: no elevated, expansive, or irritable mood with no increased energy or activity; with no inflated self-esteem or grandiosity, no decreased need for sleep, no increased rate of speech, no FOI or racing thoughts, no distractibility, no increased goal directed activity or PMA, and no risky/disinhibited behavior; reports a history of loli which may be true loli vs cluster B pathology vs substance induced or some combination of the 3     No s/s of substance withdrawal.      Discussed diagnosis, risks and benefits of proposed treatment vs alternative treatments vs no treatment, and potential side effects of these treatments.  The patient expresses understanding of the above and displays the capacity to agree with this treatment given said understanding.  Patient also agrees that, currently, the benefits outweigh the risks and would like to pursue treatment at this time.    MSE: stated age, casually dressed, well groomed.  No psychomotor agitation or retardation.  No abnormal involuntary movements.  Gait normal.  Speech normal, conversational.  Language fluent English. Mood fine.   Affect normal range, pleasant, euthymic.  Thought process linear.  Associations intact.  Denies suicidal or homicidal ideation.  Denies auditory hallucinations, paranoid ideation, ideas of reference.  Memory intact.  Attention intact.  Fund of knowledge intact.  Insight intact.  Judgment intact.  Alert and oriented to person, place, time.      Tobacco Usage:  Is patient a smoker? Yes  Does patient want prescription for Tobacco Cessation? No  Does patient want counseling for Tobacco Cessation? No    Final Diagnoses:    Principal Problem: Unspecified Mood Disorder    Secondary Diagnoses:   Unspecified Anxiety disorder      Methamphetamine Use Disorder (unable to determine or specify further)  Cannabis Use Disorder (unable to determine or specify further)  Nicotine Dependence    Labs:  Admission on 02/17/2017   Component Date Value Ref Range Status    Cholesterol 02/17/2017 169  120 - 199 mg/dL Final    Triglycerides 02/17/2017 36  30 - 150 mg/dL Final    HDL 02/17/2017 66  40 - 75 mg/dL Final    LDL Cholesterol 02/17/2017 95.8  63.0 - 159.0 mg/dL Final    HDL/Chol Ratio 02/17/2017 39.1  20.0 - 50.0 % Final    Total Cholesterol/HDL Ratio 02/17/2017 2.6  2.0 - 5.0 Final    Non-HDL Cholesterol 02/17/2017 103  mg/dL Final   Admission on 02/17/2017, Discharged on 02/17/2017   Component Date Value Ref Range Status    WBC 02/17/2017 8.61  3.90 - 12.70 K/uL Final    RBC 02/17/2017 4.18  4.00 - 5.40 M/uL Final    Hemoglobin 02/17/2017 13.0  12.0 - 16.0 g/dL Final    Hematocrit 02/17/2017 37.1  37.0 - 48.5 % Final    MCV 02/17/2017 89  82 - 98 fL Final    MCH 02/17/2017 31.1* 27.0 - 31.0 pg Final    MCHC 02/17/2017 35.0  32.0 - 36.0 % Final    RDW 02/17/2017 13.3  11.5 - 14.5 % Final    Platelets 02/17/2017 501* 150 - 350 K/uL Final    MPV 02/17/2017 9.3  9.2 - 12.9 fL Final    Gran # 02/17/2017 2.9  1.8 - 7.7 K/uL Final    Lymph # 02/17/2017 4.3  1.0 - 4.8 K/uL Final    Mono # 02/17/2017 1.0  0.3 - 1.0 K/uL  Final    Eos # 02/17/2017 0.4  0.0 - 0.5 K/uL Final    Baso # 02/17/2017 0.05  0.00 - 0.20 K/uL Final    Gran% 02/17/2017 33.1* 38.0 - 73.0 % Final    Lymph% 02/17/2017 50.1* 18.0 - 48.0 % Final    Mono% 02/17/2017 11.6  4.0 - 15.0 % Final    Eosinophil% 02/17/2017 4.6  0.0 - 8.0 % Final    Basophil% 02/17/2017 0.6  0.0 - 1.9 % Final    Differential Method 02/17/2017 Automated   Final    Sodium 02/17/2017 139  136 - 145 mmol/L Final    Potassium 02/17/2017 3.9  3.5 - 5.1 mmol/L Final    Chloride 02/17/2017 105  95 - 110 mmol/L Final    CO2 02/17/2017 26  23 - 29 mmol/L Final    Glucose 02/17/2017 98  70 - 110 mg/dL Final    BUN, Bld 02/17/2017 21* 6 - 20 mg/dL Final    Creatinine 02/17/2017 0.8  0.5 - 1.4 mg/dL Final    Calcium 02/17/2017 9.3  8.7 - 10.5 mg/dL Final    Total Protein 02/17/2017 7.6  6.0 - 8.4 g/dL Final    Albumin 02/17/2017 3.9  3.5 - 5.2 g/dL Final    Total Bilirubin 02/17/2017 0.6  0.1 - 1.0 mg/dL Final    Alkaline Phosphatase 02/17/2017 107  55 - 135 U/L Final    AST 02/17/2017 53* 10 - 40 U/L Final    ALT 02/17/2017 52* 10 - 44 U/L Final    Anion Gap 02/17/2017 8  8 - 16 mmol/L Final    eGFR if  02/17/2017 >60  >60 mL/min/1.73 m^2 Final    eGFR if non African American 02/17/2017 >60  >60 mL/min/1.73 m^2 Final    TSH 02/17/2017 0.827  0.400 - 4.000 uIU/mL Final    Specimen UA 02/17/2017 Urine, Clean Catch   Final    Color, UA 02/17/2017 Yellow  Yellow, Straw, Alexsandra Final    Appearance, UA 02/17/2017 Clear  Clear Final    pH, UA 02/17/2017 7.0  5.0 - 8.0 Final    Specific Gravity, UA 02/17/2017 <=1.005* 1.005 - 1.030 Final    Protein, UA 02/17/2017 Negative  Negative Final    Glucose, UA 02/17/2017 Negative  Negative Final    Ketones, UA 02/17/2017 Negative  Negative Final    Bilirubin (UA) 02/17/2017 Negative  Negative Final    Occult Blood UA 02/17/2017 3+* Negative Final    Nitrite, UA 02/17/2017 Negative  Negative Final    Urobilinogen, UA  02/17/2017 Negative  <2.0 EU/dL Final    Leukocytes, UA 02/17/2017 Negative  Negative Final    Benzodiazepines 02/17/2017 Negative   Final    Methadone metabolites 02/17/2017 Negative   Final    Cocaine (Metab.) 02/17/2017 Negative   Final    Opiate Scrn, Ur 02/17/2017 Negative   Final    Barbiturate Screen, Ur 02/17/2017 Negative   Final    Amphetamine Screen, Ur 02/17/2017 Presumptive Positive   Final    THC 02/17/2017 Presumptive Positive   Final    Phencyclidine 02/17/2017 Negative   Final    Creatinine, Random Ur 02/17/2017 31.5  15.0 - 325.0 mg/dL Final    Toxicology Information 02/17/2017 SEE COMMENT   Final    Alcohol, Medical, Serum 02/17/2017 <10  <10 mg/dL Final    Acetaminophen (Tylenol), Serum 02/17/2017 <3.0* 10.0 - 20.0 ug/mL Final    Salicylate Lvl 02/17/2017 <5.0* 15.0 - 30.0 mg/dL Final    Preg Test, Ur 02/17/2017 Negative   Final    RBC, UA 02/17/2017 1  0 - 4 /hpf Final    WBC, UA 02/17/2017 1  0 - 5 /hpf Final    Bacteria, UA 02/17/2017 Rare  None-Occ /hpf Final    Squam Epithel, UA 02/17/2017 3  /hpf Final    Microscopic Comment 02/17/2017 SEE COMMENT   Final         Discharged Condition: stable and improved; not currently a danger to self/others or gravely disabled    Disposition: Home or Self Care    Is patient being discharged on multiple neuroleptics? No    Follow Up/Patient Instructions:     Medications:  Reconciled Home Medications:   Discharge Medication List as of 2/21/2017 11:40 AM      CONTINUE these medications which have CHANGED    Details   quetiapine (SEROQUEL) 400 MG tablet Take 1 tablet (400 mg total) by mouth every evening., Starting 2/21/2017, Until Wed 2/21/18, Print         STOP taking these medications       clonazePAM (KLONOPIN) 1 MG tablet Comments:   Reason for Stopping:         gabapentin (NEURONTIN) 300 MG capsule Comments:   Reason for Stopping:         hydrocodone-acetaminophen (LORTAB)  mg per tablet Comments:   Reason for Stopping:          naproxen (NAPROSYN) 500 MG tablet Comments:   Reason for Stopping:             No discharge procedures on file.    Follow Up at [facility]    Diet: regular     Activity as tolerated    Total time spent discharging patient: 32 minutes    Nick Escalante MD  Psychiatry

## 2017-02-21 NOTE — TREATMENT PLAN
Care Plan =Discharge Needs Assessment:  Patient states she is going to her home (trailer). Her mother will pick her up. Patient will follow up with [facility] (does not like Dr. Moran at [facility]). Patient completed safety plan.

## 2017-02-21 NOTE — PSYCH
Patient will be following up with [facility] at [address] [place] [address] in [place], [phone].  Appointment is on 3/9 at 8 am.  Patient will receive a tobacco cessation therapy appointment at that appointment.  Faxed AVS at 1:45 pm on 2/21/2017.

## 2017-02-21 NOTE — PLAN OF CARE
Problem: Patient Care Overview (Adult)  Goal: Plan of Care Review  Outcome: Ongoing (interventions implemented as appropriate)  Pt is sleeping at this time and has slept 7 hours without interruption.  NAD.  Resp even & unlabored.  Pathways clear and bed is low.  Q 15 minute safety checks ongoing.  All precautions maintained.

## 2021-02-23 PROBLEM — G56.03 CARPAL TUNNEL SYNDROME, BILATERAL: Status: ACTIVE | Noted: 2021-02-23

## 2021-04-08 PROBLEM — S39.012A STRAIN OF LUMBAR PARASPINAL MUSCLE, INITIAL ENCOUNTER: Status: ACTIVE | Noted: 2021-04-08
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
## 2021-04-08 PROBLEM — S16.1XXA CERVICAL MYOFASCIAL STRAIN: Status: ACTIVE | Noted: 2021-04-08

## 2024-11-19 ENCOUNTER — HOSPITAL ENCOUNTER (EMERGENCY)
Facility: HOSPITAL | Age: 39
Discharge: HOME | End: 2024-11-19
Attending: STUDENT IN AN ORGANIZED HEALTH CARE EDUCATION/TRAINING PROGRAM
Payer: MEDICARE

## 2024-11-19 VITALS
WEIGHT: 91.19 LBS | OXYGEN SATURATION: 98 % | TEMPERATURE: 98 F | BODY MASS INDEX: 18.38 KG/M2 | DIASTOLIC BLOOD PRESSURE: 75 MMHG | HEIGHT: 59 IN | RESPIRATION RATE: 20 BRPM | HEART RATE: 101 BPM | SYSTOLIC BLOOD PRESSURE: 129 MMHG

## 2024-11-19 DIAGNOSIS — M54.40 ACUTE LEFT-SIDED LOW BACK PAIN WITH SCIATICA, SCIATICA LATERALITY UNSPECIFIED: ICD-10-CM

## 2024-11-19 DIAGNOSIS — T50.905A MEDICATION REACTION, INITIAL ENCOUNTER: ICD-10-CM

## 2024-11-19 DIAGNOSIS — F45.42 PAIN DISORDER ASSOCIATED WITH PSYCHOLOGICAL AND PHYSICAL FACTORS: Primary | ICD-10-CM

## 2024-11-19 LAB
ALBUMIN SERPL BCP-MCNC: 3.5 G/DL (ref 3.5–5.2)
ALP SERPL-CCNC: 91 U/L (ref 40–150)
ALT SERPL W/O P-5'-P-CCNC: 26 U/L (ref 10–44)
AMPHET+METHAMPHET UR QL: NEGATIVE
ANION GAP SERPL CALC-SCNC: 10 MMOL/L (ref 8–16)
AST SERPL-CCNC: 45 U/L (ref 10–40)
B-HCG UR QL: NEGATIVE
BARBITURATES UR QL SCN>200 NG/ML: NEGATIVE
BASOPHILS # BLD AUTO: 0.04 K/UL (ref 0–0.2)
BASOPHILS NFR BLD: 0.4 % (ref 0–1.9)
BENZODIAZ UR QL SCN>200 NG/ML: NEGATIVE
BILIRUB SERPL-MCNC: 0.4 MG/DL (ref 0.1–1)
BILIRUB UR QL STRIP: NEGATIVE
BUN SERPL-MCNC: 6 MG/DL (ref 6–20)
BZE UR QL SCN: NEGATIVE
CALCIUM SERPL-MCNC: 9.1 MG/DL (ref 8.7–10.5)
CANNABINOIDS UR QL SCN: ABNORMAL
CHLORIDE SERPL-SCNC: 107 MMOL/L (ref 95–110)
CK SERPL-CCNC: 1076 U/L (ref 20–180)
CLARITY UR: CLEAR
CO2 SERPL-SCNC: 23 MMOL/L (ref 23–29)
COLOR UR: YELLOW
CREAT SERPL-MCNC: 0.7 MG/DL (ref 0.5–1.4)
CREAT UR-MCNC: 71.4 MG/DL (ref 15–325)
DIFFERENTIAL METHOD BLD: ABNORMAL
EOSINOPHIL # BLD AUTO: 0 K/UL (ref 0–0.5)
EOSINOPHIL NFR BLD: 0 % (ref 0–8)
ERYTHROCYTE [DISTWIDTH] IN BLOOD BY AUTOMATED COUNT: 13.3 % (ref 11.5–14.5)
EST. GFR  (NO RACE VARIABLE): >60 ML/MIN/1.73 M^2
GLUCOSE SERPL-MCNC: 104 MG/DL (ref 70–110)
GLUCOSE UR QL STRIP: NEGATIVE
HCT VFR BLD AUTO: 36 % (ref 37–48.5)
HGB BLD-MCNC: 12.4 G/DL (ref 12–16)
HGB UR QL STRIP: NEGATIVE
IMM GRANULOCYTES # BLD AUTO: 0.04 K/UL (ref 0–0.04)
IMM GRANULOCYTES NFR BLD AUTO: 0.4 % (ref 0–0.5)
KETONES UR QL STRIP: NEGATIVE
LEUKOCYTE ESTERASE UR QL STRIP: NEGATIVE
LYMPHOCYTES # BLD AUTO: 1.2 K/UL (ref 1–4.8)
LYMPHOCYTES NFR BLD: 12.3 % (ref 18–48)
MCH RBC QN AUTO: 30.5 PG (ref 27–31)
MCHC RBC AUTO-ENTMCNC: 34.4 G/DL (ref 32–36)
MCV RBC AUTO: 89 FL (ref 82–98)
METHADONE UR QL SCN>300 NG/ML: NEGATIVE
MONOCYTES # BLD AUTO: 0.5 K/UL (ref 0.3–1)
MONOCYTES NFR BLD: 5.5 % (ref 4–15)
NEUTROPHILS # BLD AUTO: 8 K/UL (ref 1.8–7.7)
NEUTROPHILS NFR BLD: 81.4 % (ref 38–73)
NITRITE UR QL STRIP: NEGATIVE
NRBC BLD-RTO: 0 /100 WBC
OPIATES UR QL SCN: NEGATIVE
PCP UR QL SCN>25 NG/ML: NEGATIVE
PH UR STRIP: 8 [PH] (ref 5–8)
PLATELET # BLD AUTO: 315 K/UL (ref 150–450)
PMV BLD AUTO: 9.9 FL (ref 9.2–12.9)
POTASSIUM SERPL-SCNC: 3.8 MMOL/L (ref 3.5–5.1)
PROT SERPL-MCNC: 7.1 G/DL (ref 6–8.4)
PROT UR QL STRIP: NEGATIVE
RBC # BLD AUTO: 4.06 M/UL (ref 4–5.4)
SODIUM SERPL-SCNC: 140 MMOL/L (ref 136–145)
SP GR UR STRIP: 1.02 (ref 1–1.03)
TOXICOLOGY INFORMATION: ABNORMAL
URN SPEC COLLECT METH UR: NORMAL
UROBILINOGEN UR STRIP-ACNC: NEGATIVE EU/DL
WBC # BLD AUTO: 9.84 K/UL (ref 3.9–12.7)

## 2024-11-19 PROCEDURE — 81003 URINALYSIS AUTO W/O SCOPE: CPT | Mod: 59 | Performed by: STUDENT IN AN ORGANIZED HEALTH CARE EDUCATION/TRAINING PROGRAM

## 2024-11-19 PROCEDURE — 82550 ASSAY OF CK (CPK): CPT | Performed by: SURGERY

## 2024-11-19 PROCEDURE — 96374 THER/PROPH/DIAG INJ IV PUSH: CPT

## 2024-11-19 PROCEDURE — 81025 URINE PREGNANCY TEST: CPT | Performed by: STUDENT IN AN ORGANIZED HEALTH CARE EDUCATION/TRAINING PROGRAM

## 2024-11-19 PROCEDURE — 96372 THER/PROPH/DIAG INJ SC/IM: CPT | Performed by: STUDENT IN AN ORGANIZED HEALTH CARE EDUCATION/TRAINING PROGRAM

## 2024-11-19 PROCEDURE — 80053 COMPREHEN METABOLIC PANEL: CPT | Performed by: SURGERY

## 2024-11-19 PROCEDURE — 85025 COMPLETE CBC W/AUTO DIFF WBC: CPT | Performed by: SURGERY

## 2024-11-19 PROCEDURE — 80307 DRUG TEST PRSMV CHEM ANLYZR: CPT | Performed by: STUDENT IN AN ORGANIZED HEALTH CARE EDUCATION/TRAINING PROGRAM

## 2024-11-19 PROCEDURE — 96375 TX/PRO/DX INJ NEW DRUG ADDON: CPT

## 2024-11-19 PROCEDURE — 63600175 PHARM REV CODE 636 W HCPCS: Performed by: STUDENT IN AN ORGANIZED HEALTH CARE EDUCATION/TRAINING PROGRAM

## 2024-11-19 PROCEDURE — 99285 EMERGENCY DEPT VISIT HI MDM: CPT | Mod: 25

## 2024-11-19 RX ORDER — HALOPERIDOL 5 MG/ML
5 INJECTION INTRAMUSCULAR
Status: COMPLETED | OUTPATIENT
Start: 2024-11-19 | End: 2024-11-19

## 2024-11-19 RX ORDER — BENZTROPINE MESYLATE 1 MG/ML
2 INJECTION, SOLUTION INTRAMUSCULAR; INTRAVENOUS
Status: COMPLETED | OUTPATIENT
Start: 2024-11-19 | End: 2024-11-19

## 2024-11-19 RX ORDER — LORAZEPAM 2 MG/ML
2 INJECTION INTRAMUSCULAR
Status: COMPLETED | OUTPATIENT
Start: 2024-11-19 | End: 2024-11-19

## 2024-11-19 RX ORDER — MIDAZOLAM HYDROCHLORIDE 2 MG/2ML
4 INJECTION, SOLUTION INTRAMUSCULAR; INTRAVENOUS
Status: COMPLETED | OUTPATIENT
Start: 2024-11-19 | End: 2024-11-19

## 2024-11-19 RX ORDER — IBUPROFEN 800 MG/1
800 TABLET ORAL EVERY 6 HOURS PRN
Qty: 20 TABLET | Refills: 0 | Status: SHIPPED | OUTPATIENT
Start: 2024-11-19

## 2024-11-19 RX ORDER — CYCLOBENZAPRINE HCL 10 MG
10 TABLET ORAL 3 TIMES DAILY PRN
Qty: 10 TABLET | Refills: 0 | Status: SHIPPED | OUTPATIENT
Start: 2024-11-19 | End: 2024-11-24

## 2024-11-19 RX ORDER — GABAPENTIN 100 MG/1
100 CAPSULE ORAL 3 TIMES DAILY
Qty: 90 CAPSULE | Refills: 11 | Status: SHIPPED | OUTPATIENT
Start: 2024-11-19 | End: 2025-11-19

## 2024-11-19 RX ORDER — DIPHENHYDRAMINE HYDROCHLORIDE 50 MG/ML
50 INJECTION INTRAMUSCULAR; INTRAVENOUS ONCE
Status: COMPLETED | OUTPATIENT
Start: 2024-11-19 | End: 2024-11-19

## 2024-11-19 RX ORDER — PREDNISONE 20 MG/1
40 TABLET ORAL DAILY
Qty: 10 TABLET | Refills: 0 | Status: SHIPPED | OUTPATIENT
Start: 2024-11-19 | End: 2024-11-24

## 2024-11-19 RX ADMIN — LORAZEPAM 2 MG: 2 INJECTION INTRAMUSCULAR; INTRAVENOUS at 02:11

## 2024-11-19 RX ADMIN — DIPHENHYDRAMINE HYDROCHLORIDE 50 MG: 50 INJECTION INTRAMUSCULAR; INTRAVENOUS at 02:11

## 2024-11-19 RX ADMIN — BENZTROPINE MESYLATE 2 MG: 1 INJECTION INTRAMUSCULAR; INTRAVENOUS at 05:11

## 2024-11-19 RX ADMIN — MIDAZOLAM HYDROCHLORIDE 4 MG: 1 INJECTION, SOLUTION INTRAMUSCULAR; INTRAVENOUS at 05:11

## 2024-11-19 RX ADMIN — HALOPERIDOL LACTATE 5 MG: 5 INJECTION, SOLUTION INTRAMUSCULAR at 03:11

## 2024-11-19 NOTE — ED PROVIDER NOTES
"Encounter Date: 2024       History     Chief Complaint   Patient presents with    Back Pain     Pt presents to ED with c/o back pain and bilateral leg pain rated 10/10 since  on 24. Pt states, "It only does this when I take my Seroquel." Pt is thrashing about during triage. EMS reports giving pt 30 mg Toradol IM in route.     HPI    40 yo F presents via EMS with back pain and leg pain after taking seroquel. Pt states she is only able to get comfortable in certain positions. She states it feels like she can't sit still. States this has happened before but never to this extent. Denies trauma. Denies drug use or other ingestions tonight.     Review of patient's allergies indicates:  No Known Allergies  Past Medical History:   Diagnosis Date    Anxiety     Bipolar 1 disorder     Depression     Hx of psychiatric care     Zydis 10mg; Vistaril 50mg; Prozac 20mg via Dr. Boss outpt.2012 - Klonopin 0.5mg added 10.24.2012    Marisel     Polycystic ovaries     Post traumatic stress disorder due to war, terrorism, or hostility     Psychiatric problem     anxiety     Past Surgical History:   Procedure Laterality Date    BACK SURGERY       SECTION      face reconstruction      NECK SURGERY      SPLENECTOMY, TOTAL      TRACHEOSTOMY TUBE PLACEMENT       Family History   Problem Relation Name Age of Onset    Anxiety disorder Mother      Depression Mother      Depression Maternal Grandmother      Anxiety disorder Maternal Grandmother      Depression Sister      Anxiety disorder Sister       Social History     Tobacco Use    Smoking status: Every Day     Current packs/day: 0.50     Types: Cigarettes    Smokeless tobacco: Never    Tobacco comments:     not interested   Substance Use Topics    Alcohol use: No    Drug use: Yes     Types: Amphetamines, Heroin, Marijuana, IV     Comment: heroin IV TODAY      Review of Systems   Respiratory:  Negative for shortness of breath.    Cardiovascular:  Negative for " chest pain.   Musculoskeletal:  Positive for back pain.   Neurological:  Negative for headaches.       Physical Exam     Initial Vitals [11/19/24 0225]   BP Pulse Resp Temp SpO2   (!) 161/78 (!) 115 (!) 22 97.9 °F (36.6 °C) 98 %      MAP       --         Physical Exam    Constitutional: She is not diaphoretic. She appears distressed.   HENT:   Head: Atraumatic.   Neck: Neck supple.   Cardiovascular:  Intact distal pulses.   Tachycardia present.         Pulmonary/Chest: No respiratory distress.   Musculoskeletal:      Cervical back: Neck supple.     Neurological: She is alert. She has normal strength. GCS score is 15. GCS eye subscore is 4. GCS verbal subscore is 5. GCS motor subscore is 6.   Skin: Skin is warm and dry. No erythema.   Psychiatric: Her mood appears anxious. Her speech is rapid and/or pressured. She is hyperactive. She expresses no homicidal and no suicidal ideation.         ED Course   Procedures  Labs Reviewed   URINALYSIS, REFLEX TO URINE CULTURE       Result Value    Specimen UA Urine, Clean Catch      Color, UA Yellow      Appearance, UA Clear      pH, UA 8.0      Specific Gravity, UA 1.020      Protein, UA Negative      Glucose, UA Negative      Ketones, UA Negative      Bilirubin (UA) Negative      Occult Blood UA Negative      Nitrite, UA Negative      Urobilinogen, UA Negative      Leukocytes, UA Negative      Narrative:     Specimen Source->Urine          Imaging Results    None          Medications   midazolam (PF) (VERSED) 1 mg/mL injection 4 mg (has no administration in time range)   LORazepam injection 2 mg (2 mg Intramuscular Given 11/19/24 0254)   diphenhydrAMINE injection 50 mg (50 mg Intramuscular Given 11/19/24 0254)   haloperidol lactate injection 5 mg (5 mg Intramuscular Given 11/19/24 0330)     Medical Decision Making  Amount and/or Complexity of Data Reviewed  Independent Historian: parent  External Data Reviewed: notes.  Labs: ordered. Decision-making details documented in ED  Course.    Risk  Drug therapy requiring intensive monitoring for toxicity.  Diagnosis or treatment significantly limited by social determinants of health.               ED Course as of 11/19/24 0506   Tue Nov 19, 2024   0322 Patient given benadryl and ativan for possible akathisia reaction. Pt condition unchanged. Will given IM haldol to help with patients agitation likely related to suspect sympathomimetic use  [DR]   0505 Patient with some improvement in symptoms but still unable to remain still for any significant period of time. Will place IV and give additional benzos and reassess [DR]   0506 Blood, UA: Negative  Unlikely renal colic with negative blood on UA []      ED Course User Index  [] Solo Low MD                           Clinical Impression:  Final diagnoses:  [T50.357F] Medication reaction, initial encounter (Primary)     Possible reaction to antipsychotic vs acute intoxication from sympathomimetic   Ua without evidence of infection or blood, unlikely UTI or renal colic  Pt ambulatory with normal LE function, unlikely acute spinal cord pathology  No midline tenderness, do not suspect epidural abscess  Pt given multiple rounds of IM medications without significant effect  Will place IV and give additional benzos  Dispo pending symptom improvement  Care transitioned to oncoming provider      ED Disposition Condition    Discharge Stable          ED Prescriptions    None       Follow-up Information       Follow up With Specialties Details Why Contact Info     [facility] - Emergency Dept Emergency Medicine Go to  As needed, If symptoms worsen [address] [facility] [id number] [phone]             Solo Low MD  11/19/24 [id number]

## 2024-11-19 NOTE — Clinical Note
"Izabella Zhaojose Ellis was seen and treated in our emergency department on 11/19/2024.  She may return to work on 11/21/2024.       If you have any questions or concerns, please don't hesitate to call.      LAVONNE Srivastava NRP"

## 2024-11-19 NOTE — PROVIDER PROGRESS NOTES - EMERGENCY DEPT.
ER Physician Progress/Interval Note     I took over this patient at the 6:00 a.m. shift change from the nighttime provider  Patient complained of low back pain, was kicking & thrashing around the bed  On my arrival of the patient was comfortable & sleeping, I woke her up for exam  Patient was started kicking her legs again, stating that she was low back pain  Normal neuro exam with no signs of cauda equina syndrome on ER evaluation    Discussion with the family, proceed to lab work which was well within normal limits  I decided to do a CT scan of the back with shows severe foraminal narrowing L5/S1  I prescribe the patient anti-inflammatory muscle relaxer steroid & refilled gabapentin  Additionally I made an ambulatory referral to the local back/Pain Clinic for evaluation  Patient was feeling much better, she states this is what happens with Seroquel Rx  I have instructed her to stop her Seroquel Rx & follow-up with her prescribing MD  Pt/Family counseled to return to the ER with any concerns on discharge this a.m.  Patient was leads pain-free, feeling better, voiced understanding on instructions    Need for Hospitalization or Surgery with Social Determinants of Health:  This patient does not need to be hospitalized on ER evaluation today  The patient's diagnosis is not limited by social determinants of health  Does not require surgery or procedure (major or minor), no risk factors       Andrae Livingston M.D. 9:49 AM 11/19/2024

## 2024-11-19 NOTE — DISCHARGE INSTRUCTIONS
Please stopped taking Seroquel.  Follow-up with PCP regarding options for placing Seroquel.  Take pain medicines as prescribed by the ER as directed.  Ambulatory referral to pain clinic at [facility] on ER discharge.  Return with any concerns on discharge.       Andrae Livingston M.D. 9:47 AM 11/19/2024

## 2024-11-19 NOTE — ED TRIAGE NOTES
"39 y.o. female presents to ER ED 03 /ED 03B   Chief Complaint   Patient presents with    Back Pain     Pt presents to ED with c/o back pain and bilateral leg pain rated 10/10 since 2130 on 11/18/24. Pt states, "It only does this when I take my Seroquel." Pt is thrashing about during triage. EMS reports giving pt 30 mg Toradol IM in route.     "

## 2025-07-03 ENCOUNTER — HOSPITAL ENCOUNTER (EMERGENCY)
Facility: HOSPITAL | Age: 40
Discharge: HOME | End: 2025-07-03
Attending: SURGERY
Payer: MEDICARE

## 2025-07-03 VITALS
BODY MASS INDEX: 22.78 KG/M2 | HEART RATE: 78 BPM | HEIGHT: 59 IN | OXYGEN SATURATION: 98 % | TEMPERATURE: 99 F | RESPIRATION RATE: 16 BRPM | DIASTOLIC BLOOD PRESSURE: 93 MMHG | SYSTOLIC BLOOD PRESSURE: 143 MMHG | WEIGHT: 113 LBS
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
## 2025-07-03 DIAGNOSIS — S39.012A STRAIN OF LUMBAR REGION, INITIAL ENCOUNTER: ICD-10-CM

## 2025-07-03 LAB
AMPHET UR QL SCN: ABNORMAL
B-HCG UR QL: NEGATIVE
BARBITURATE SCN PRESENT UR: NEGATIVE
BENZODIAZ UR QL SCN: NEGATIVE
BILIRUB UR QL STRIP.AUTO: NEGATIVE
CANNABINOIDS UR QL SCN: ABNORMAL
CLARITY UR: CLEAR
COCAINE UR QL SCN: NEGATIVE
COLOR UR AUTO: YELLOW
CREAT UR-MCNC: 79 MG/DL (ref 15–325)
GLUCOSE UR QL STRIP: NEGATIVE
HGB UR QL STRIP: ABNORMAL
KETONES UR QL STRIP: NEGATIVE
LEUKOCYTE ESTERASE UR QL STRIP: NEGATIVE
METHADONE UR QL SCN: NEGATIVE
NITRITE UR QL STRIP: NEGATIVE
OPIATES UR QL SCN: NEGATIVE
PCP UR QL: NEGATIVE
PH UR STRIP: 6 [PH]
PROT UR QL STRIP: NEGATIVE
SP GR UR STRIP: 1.01
UROBILINOGEN UR STRIP-ACNC: NEGATIVE EU/DL

## 2025-07-03 PROCEDURE — 99285 EMERGENCY DEPT VISIT HI MDM: CPT | Mod: 25

## 2025-07-03 PROCEDURE — 81003 URINALYSIS AUTO W/O SCOPE: CPT | Performed by: NURSE PRACTITIONER

## 2025-07-03 PROCEDURE — 80307 DRUG TEST PRSMV CHEM ANLYZR: CPT | Performed by: NURSE PRACTITIONER

## 2025-07-03 PROCEDURE — 81025 URINE PREGNANCY TEST: CPT | Performed by: NURSE PRACTITIONER

## 2025-07-03 PROCEDURE — 25000003 PHARM REV CODE 250: Performed by: NURSE PRACTITIONER

## 2025-07-03 RX ORDER — IBUPROFEN 800 MG/1
800 TABLET, FILM COATED ORAL
Status: COMPLETED | OUTPATIENT
Start: 2025-07-03 | End: 2025-07-03

## 2025-07-03 RX ORDER — CYCLOBENZAPRINE HCL 10 MG
10 TABLET ORAL
Status: COMPLETED | OUTPATIENT
Start: 2025-07-03 | End: 2025-07-03

## 2025-07-03 RX ORDER — ORPHENADRINE CITRATE 30 MG/ML
60 INJECTION INTRAMUSCULAR; INTRAVENOUS
Status: DISCONTINUED | OUTPATIENT
Start: 2025-07-03 | End: 2025-07-03

## 2025-07-03 RX ORDER — IBUPROFEN 800 MG/1
800 TABLET, FILM COATED ORAL EVERY 8 HOURS PRN
Qty: 20 TABLET | Refills: 0 | Status: SHIPPED | OUTPATIENT
Start: 2025-07-03 | End: 2025-07-05 | Stop reason: ALTCHOICE

## 2025-07-03 RX ORDER — TIZANIDINE 4 MG/1
4 TABLET ORAL EVERY 8 HOURS PRN
Qty: 12 TABLET | Refills: 0 | Status: SHIPPED | OUTPATIENT
Start: 2025-07-03

## 2025-07-03 RX ORDER — KETOROLAC TROMETHAMINE 30 MG/ML
15 INJECTION, SOLUTION INTRAMUSCULAR; INTRAVENOUS
Status: DISCONTINUED | OUTPATIENT
Start: 2025-07-03 | End: 2025-07-03

## 2025-07-03 RX ADMIN — CYCLOBENZAPRINE HYDROCHLORIDE 10 MG: 10 TABLET, FILM COATED ORAL at 04:07

## 2025-07-03 RX ADMIN — IBUPROFEN 800 MG: 800 TABLET, FILM COATED ORAL at 04:07

## 2025-07-03 NOTE — ED NOTES
PATIENT FOUND TO BE VERY UNCOMFORTABLE AND TEARFUL IN LOBBY WHEN BROUGHT TO CONSULT ROOM. PROVIDER NOTIFIED.
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
## 2025-07-04 LAB — HOLD SPECIMEN: NORMAL

## 2025-07-05 ENCOUNTER — HOSPITAL ENCOUNTER (EMERGENCY)
Facility: HOSPITAL | Age: 40
Discharge: HOME | End: 2025-07-05
Attending: STUDENT IN AN ORGANIZED HEALTH CARE EDUCATION/TRAINING PROGRAM
Payer: MEDICARE

## 2025-07-05 VITALS
HEART RATE: 80 BPM | BODY MASS INDEX: 22.78 KG/M2 | RESPIRATION RATE: 18 BRPM | DIASTOLIC BLOOD PRESSURE: 80 MMHG | TEMPERATURE: 99 F | SYSTOLIC BLOOD PRESSURE: 126 MMHG | OXYGEN SATURATION: 98 % | WEIGHT: 113 LBS | HEIGHT: 59 IN

## 2025-07-05 DIAGNOSIS — M54.31 SCIATICA OF RIGHT SIDE: Primary | ICD-10-CM

## 2025-07-05 PROCEDURE — 96372 THER/PROPH/DIAG INJ SC/IM: CPT | Performed by: STUDENT IN AN ORGANIZED HEALTH CARE EDUCATION/TRAINING PROGRAM

## 2025-07-05 PROCEDURE — 99284 EMERGENCY DEPT VISIT MOD MDM: CPT | Mod: 25

## 2025-07-05 PROCEDURE — 63600175 PHARM REV CODE 636 W HCPCS: Performed by: STUDENT IN AN ORGANIZED HEALTH CARE EDUCATION/TRAINING PROGRAM

## 2025-07-05 RX ORDER — DEXAMETHASONE SODIUM PHOSPHATE 4 MG/ML
8 INJECTION, SOLUTION INTRA-ARTICULAR; INTRALESIONAL; INTRAMUSCULAR; INTRAVENOUS; SOFT TISSUE
Status: COMPLETED | OUTPATIENT
Start: 2025-07-05 | End: 2025-07-05

## 2025-07-05 RX ORDER — MELOXICAM 7.5 MG/1
15 TABLET ORAL DAILY
Qty: 14 TABLET | Refills: 0 | Status: SHIPPED | OUTPATIENT
Start: 2025-07-05 | End: 2025-07-07 | Stop reason: SDUPTHER

## 2025-07-05 RX ORDER — KETOROLAC TROMETHAMINE 30 MG/ML
15 INJECTION, SOLUTION INTRAMUSCULAR; INTRAVENOUS
Status: COMPLETED | OUTPATIENT
Start: 2025-07-05 | End: 2025-07-05

## 2025-07-05 RX ADMIN — KETOROLAC TROMETHAMINE 15 MG: 30 INJECTION, SOLUTION INTRAMUSCULAR at 07:07

## 2025-07-05 RX ADMIN — DEXAMETHASONE SODIUM PHOSPHATE 8 MG: 4 INJECTION, SOLUTION INTRA-ARTICULAR; INTRALESIONAL; INTRAMUSCULAR; INTRAVENOUS; SOFT TISSUE at 07:07
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
## 2025-07-07 ENCOUNTER — TELEPHONE (OUTPATIENT)
Dept: EMERGENCY MEDICINE | Facility: HOSPITAL | Age: 40
End: 2025-07-07
Payer: MEDICARE

## 2025-07-07 ENCOUNTER — TELEPHONE (OUTPATIENT)
Dept: ORTHOPEDICS | Facility: CLINIC | Age: 40
End: 2025-07-07
Payer: MEDICARE

## 2025-07-07 RX ORDER — MELOXICAM 7.5 MG/1
15 TABLET ORAL DAILY
Qty: 14 TABLET | Refills: 0 | Status: SHIPPED | OUTPATIENT
Start: 2025-07-07

## 2025-07-07 NOTE — TELEPHONE ENCOUNTER
Attempted to return pt call, no answer, LVM      ----- Message from Med Assistant Sultana sent at 2025 11:17 AM CDT -----  Izabella Ellis  MRN: [id number]  : 1985  PCP: No, Primary Doctor  Home Phone      [phone]  Work Phone      Not on file.  Mobile          [phone]      MESSAGE:    Patient stated she is returning a call to scheduled NP appointment, referral placed in chart.

## 2025-07-08 ENCOUNTER — TELEPHONE (OUTPATIENT)
Dept: ORTHOPEDICS | Facility: CLINIC | Age: 40
End: 2025-07-08
Payer: MEDICARE

## 2025-07-08 ENCOUNTER — TELEPHONE (OUTPATIENT)
Dept: PAIN MEDICINE | Facility: CLINIC | Age: 40
End: 2025-07-08
Payer: MEDICARE

## 2025-07-08 NOTE — TELEPHONE ENCOUNTER
----- Message from Sushila sent at 2025  8:39 AM CDT -----  Contact: pt  Izabella Ellis  MRN: [id number]  : 1985  PCP: No, Primary Doctor  Home Phone      [phone]  Work Phone      Not on file.  Mobile          [phone]      MESSAGE: Pt was seen at [facility] and is requesting a call back in regards to a referral from the Er. Pt states she is in a lot of pain. She will probably go back to the Er.      [phone]

## 2025-07-22 ENCOUNTER — OFFICE VISIT (OUTPATIENT)
Dept: PAIN MEDICINE | Facility: CLINIC | Age: 40
End: 2025-07-22
Payer: MEDICARE

## 2025-07-22 VITALS
DIASTOLIC BLOOD PRESSURE: 80 MMHG | HEART RATE: 93 BPM | SYSTOLIC BLOOD PRESSURE: 101 MMHG | BODY MASS INDEX: 21.97 KG/M2 | HEIGHT: 59 IN | OXYGEN SATURATION: 93 % | WEIGHT: 109 LBS

## 2025-07-22 DIAGNOSIS — M54.16 LUMBAR RADICULOPATHY: ICD-10-CM

## 2025-07-22 DIAGNOSIS — M51.362 DEGENERATION OF INTERVERTEBRAL DISC OF LUMBAR REGION WITH DISCOGENIC BACK PAIN AND LOWER EXTREMITY PAIN: ICD-10-CM

## 2025-07-22 DIAGNOSIS — M54.9 DORSALGIA, UNSPECIFIED: Primary | ICD-10-CM

## 2025-07-22 DIAGNOSIS — M54.16 LUMBAR RADICULITIS: ICD-10-CM

## 2025-07-22 DIAGNOSIS — M54.9 DORSALGIA: ICD-10-CM

## 2025-07-22 PROCEDURE — 1160F RVW MEDS BY RX/DR IN RCRD: CPT | Mod: CPTII,S$GLB,, | Performed by: ANESTHESIOLOGY

## 2025-07-22 PROCEDURE — 3079F DIAST BP 80-89 MM HG: CPT | Mod: CPTII,S$GLB,, | Performed by: ANESTHESIOLOGY

## 2025-07-22 PROCEDURE — 99999 PR PBB SHADOW E&M-EST. PATIENT-LVL IV: CPT | Mod: PBBFAC,,, | Performed by: ANESTHESIOLOGY

## 2025-07-22 PROCEDURE — G2211 COMPLEX E/M VISIT ADD ON: HCPCS | Mod: S$GLB,,, | Performed by: ANESTHESIOLOGY

## 2025-07-22 PROCEDURE — 1159F MED LIST DOCD IN RCRD: CPT | Mod: CPTII,S$GLB,, | Performed by: ANESTHESIOLOGY

## 2025-07-22 PROCEDURE — 99204 OFFICE O/P NEW MOD 45 MIN: CPT | Mod: S$GLB,,, | Performed by: ANESTHESIOLOGY

## 2025-07-22 PROCEDURE — 3074F SYST BP LT 130 MM HG: CPT | Mod: CPTII,S$GLB,, | Performed by: ANESTHESIOLOGY

## 2025-07-22 PROCEDURE — 3008F BODY MASS INDEX DOCD: CPT | Mod: CPTII,S$GLB,, | Performed by: ANESTHESIOLOGY

## 2025-07-22 RX ORDER — BUPRENORPHINE 128 MG/.36ML
INJECTION SUBCUTANEOUS
COMMUNITY
Start: 2025-06-12

## 2025-07-22 RX ORDER — GABAPENTIN 600 MG/1
600 TABLET ORAL 3 TIMES DAILY
Qty: 90 TABLET | Refills: 11 | Status: SHIPPED | OUTPATIENT
Start: 2025-07-22 | End: 2026-07-22

## 2025-07-22 RX ORDER — BREXPIPRAZOLE 1 MG/1
1 TABLET ORAL DAILY
COMMUNITY
Start: 2025-05-13
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
## 2025-07-30 ENCOUNTER — TELEPHONE (OUTPATIENT)
Dept: EMERGENCY MEDICINE | Facility: HOSPITAL | Age: 40
End: 2025-07-30
Payer: MEDICARE
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
## 2025-07-31 ENCOUNTER — HOSPITAL ENCOUNTER (OUTPATIENT)
Dept: RADIOLOGY | Facility: HOSPITAL | Age: 40
Discharge: HOME | End: 2025-07-31
Attending: ANESTHESIOLOGY
Payer: MEDICARE

## 2025-07-31 DIAGNOSIS — M54.9 DORSALGIA, UNSPECIFIED: ICD-10-CM

## 2025-07-31 DIAGNOSIS — M54.16 LUMBAR RADICULOPATHY: ICD-10-CM

## 2025-07-31 PROCEDURE — 72148 MRI LUMBAR SPINE W/O DYE: CPT | Mod: TC

## 2025-07-31 PROCEDURE — 72148 MRI LUMBAR SPINE W/O DYE: CPT | Mod: 26,,, | Performed by: RADIOLOGY

## 2025-08-07 ENCOUNTER — OFFICE VISIT (OUTPATIENT)
Dept: PAIN MEDICINE | Facility: CLINIC | Age: 40
End: 2025-08-07
Payer: MEDICARE

## 2025-08-07 ENCOUNTER — TELEPHONE (OUTPATIENT)
Dept: PAIN MEDICINE | Facility: CLINIC | Age: 40
End: 2025-08-07
Payer: MEDICARE

## 2025-08-07 VITALS
WEIGHT: 113 LBS | HEIGHT: 59 IN | HEART RATE: 67 BPM | SYSTOLIC BLOOD PRESSURE: 119 MMHG | OXYGEN SATURATION: 97 % | BODY MASS INDEX: 22.78 KG/M2 | DIASTOLIC BLOOD PRESSURE: 81 MMHG

## 2025-08-07 DIAGNOSIS — M54.16 LUMBAR RADICULOPATHY: Primary | ICD-10-CM

## 2025-08-07 DIAGNOSIS — M51.362 DEGENERATION OF INTERVERTEBRAL DISC OF LUMBAR REGION WITH DISCOGENIC BACK PAIN AND LOWER EXTREMITY PAIN: ICD-10-CM

## 2025-08-07 DIAGNOSIS — M54.16 LUMBAR RADICULITIS: Primary | ICD-10-CM

## 2025-08-07 DIAGNOSIS — M47.816 LUMBAR FACET ARTHROPATHY: ICD-10-CM

## 2025-08-07 DIAGNOSIS — M54.16 LUMBAR RADICULOPATHY: ICD-10-CM

## 2025-08-07 DIAGNOSIS — M51.26 LUMBAR HERNIATED DISC: ICD-10-CM

## 2025-08-07 PROCEDURE — 99999 PR PBB SHADOW E&M-EST. PATIENT-LVL III: CPT | Mod: PBBFAC,,, | Performed by: ANESTHESIOLOGY

## 2025-08-07 PROCEDURE — 1160F RVW MEDS BY RX/DR IN RCRD: CPT | Mod: CPTII,S$GLB,, | Performed by: ANESTHESIOLOGY

## 2025-08-07 PROCEDURE — 99214 OFFICE O/P EST MOD 30 MIN: CPT | Mod: S$GLB,,, | Performed by: ANESTHESIOLOGY

## 2025-08-07 PROCEDURE — 1159F MED LIST DOCD IN RCRD: CPT | Mod: CPTII,S$GLB,, | Performed by: ANESTHESIOLOGY

## 2025-08-07 PROCEDURE — 3008F BODY MASS INDEX DOCD: CPT | Mod: CPTII,S$GLB,, | Performed by: ANESTHESIOLOGY

## 2025-08-07 PROCEDURE — 3079F DIAST BP 80-89 MM HG: CPT | Mod: CPTII,S$GLB,, | Performed by: ANESTHESIOLOGY

## 2025-08-07 PROCEDURE — 3074F SYST BP LT 130 MM HG: CPT | Mod: CPTII,S$GLB,, | Performed by: ANESTHESIOLOGY
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
## 2025-08-07 NOTE — TELEPHONE ENCOUNTER
----- Message from Radha Soto MD sent at 8/7/2025 11:16 AM CDT -----  · Interventional Therapy: Please schedule for  Bilateral Transforaminal epidural steroid injection at L5 .   · Sedation: Iv sedation   · Anticoagulation medications: None -Clearance to stop Blood thinner: No

## 2025-08-14 ENCOUNTER — TELEPHONE (OUTPATIENT)
Dept: PREADMISSION TESTING | Facility: HOSPITAL | Age: 40
End: 2025-08-14
Payer: MEDICARE

## 2025-08-15 ENCOUNTER — HOSPITAL ENCOUNTER (OUTPATIENT)
Dept: RADIOLOGY | Facility: HOSPITAL | Age: 40
Discharge: HOME | End: 2025-08-15
Attending: ANESTHESIOLOGY
Payer: MEDICARE

## 2025-08-15 ENCOUNTER — HOSPITAL ENCOUNTER (OUTPATIENT)
Facility: HOSPITAL | Age: 40
Discharge: HOME | End: 2025-08-15
Attending: ANESTHESIOLOGY | Admitting: ANESTHESIOLOGY
Payer: MEDICARE

## 2025-08-15 VITALS
RESPIRATION RATE: 20 BRPM | SYSTOLIC BLOOD PRESSURE: 134 MMHG | HEART RATE: 71 BPM | OXYGEN SATURATION: 94 % | TEMPERATURE: 97 F | DIASTOLIC BLOOD PRESSURE: 82 MMHG

## 2025-08-15 DIAGNOSIS — R52 PAIN: ICD-10-CM

## 2025-08-15 DIAGNOSIS — Z01.818 PREOP TESTING: ICD-10-CM

## 2025-08-15 DIAGNOSIS — M54.16 LUMBAR RADICULOPATHY: Primary | ICD-10-CM

## 2025-08-15 DIAGNOSIS — M54.16 LUMBAR RADICULOPATHY: ICD-10-CM

## 2025-08-15 LAB
B-HCG UR QL: NEGATIVE
CTP QC/QA: YES

## 2025-08-15 PROCEDURE — 64483 NJX AA&/STRD TFRM EPI L/S 1: CPT | Mod: 50,,, | Performed by: ANESTHESIOLOGY

## 2025-08-15 PROCEDURE — 64483 NJX AA&/STRD TFRM EPI L/S 1: CPT | Mod: 50 | Performed by: ANESTHESIOLOGY

## 2025-08-15 PROCEDURE — 63600175 PHARM REV CODE 636 W HCPCS: Performed by: ANESTHESIOLOGY

## 2025-08-15 PROCEDURE — 99152 MOD SED SAME PHYS/QHP 5/>YRS: CPT | Performed by: ANESTHESIOLOGY

## 2025-08-15 PROCEDURE — 25500020 PHARM REV CODE 255: Performed by: ANESTHESIOLOGY

## 2025-08-15 PROCEDURE — 76000 FLUOROSCOPY <1 HR PHYS/QHP: CPT | Mod: TC

## 2025-08-15 RX ORDER — LIDOCAINE HYDROCHLORIDE 20 MG/ML
INJECTION, SOLUTION INFILTRATION; PERINEURAL
Status: DISCONTINUED | OUTPATIENT
Start: 2025-08-15 | End: 2025-08-15 | Stop reason: HOSPADM

## 2025-08-15 RX ORDER — LIDOCAINE HYDROCHLORIDE 10 MG/ML
INJECTION, SOLUTION EPIDURAL; INFILTRATION; INTRACAUDAL; PERINEURAL
Status: DISCONTINUED | OUTPATIENT
Start: 2025-08-15 | End: 2025-08-15 | Stop reason: HOSPADM

## 2025-08-15 RX ORDER — DEXAMETHASONE SODIUM PHOSPHATE 10 MG/ML
INJECTION, SOLUTION INTRA-ARTICULAR; INTRALESIONAL; INTRAMUSCULAR; INTRAVENOUS; SOFT TISSUE
Status: DISCONTINUED | OUTPATIENT
Start: 2025-08-15 | End: 2025-08-15 | Stop reason: HOSPADM

## 2025-08-15 RX ORDER — FENTANYL CITRATE 50 UG/ML
INJECTION, SOLUTION INTRAMUSCULAR; INTRAVENOUS
Status: DISCONTINUED | OUTPATIENT
Start: 2025-08-15 | End: 2025-08-15 | Stop reason: HOSPADM

## 2025-08-15 RX ORDER — SODIUM CHLORIDE 9 MG/ML
500 INJECTION, SOLUTION INTRAVENOUS CONTINUOUS
Status: DISCONTINUED | OUTPATIENT
Start: 2025-08-15 | End: 2025-08-15 | Stop reason: HOSPADM

## 2025-08-15 RX ORDER — MIDAZOLAM HYDROCHLORIDE 1 MG/ML
INJECTION INTRAMUSCULAR; INTRAVENOUS
Status: DISCONTINUED | OUTPATIENT
Start: 2025-08-15 | End: 2025-08-15 | Stop reason: HOSPADM

## 2025-08-21 ENCOUNTER — CLINICAL SUPPORT (OUTPATIENT)
Dept: REHABILITATION | Facility: HOSPITAL | Age: 40
End: 2025-08-21
Attending: ANESTHESIOLOGY
Payer: MEDICARE

## 2025-08-21 DIAGNOSIS — M54.50 ACUTE BILATERAL LOW BACK PAIN WITHOUT SCIATICA: Primary | ICD-10-CM

## 2025-08-21 PROCEDURE — 97161 PT EVAL LOW COMPLEX 20 MIN: CPT | Mod: PN

## 2025-08-21 PROCEDURE — 97110 THERAPEUTIC EXERCISES: CPT | Mod: PN

## 2025-08-27 ENCOUNTER — CLINICAL SUPPORT (OUTPATIENT)
Dept: REHABILITATION | Facility: HOSPITAL | Age: 40
End: 2025-08-27
Payer: MEDICARE

## 2025-08-27 DIAGNOSIS — M54.50 ACUTE BILATERAL LOW BACK PAIN WITHOUT SCIATICA: Primary | ICD-10-CM

## 2025-08-27 PROCEDURE — 97110 THERAPEUTIC EXERCISES: CPT | Mod: PN,CQ

## 2025-09-02 ENCOUNTER — OFFICE VISIT (OUTPATIENT)
Dept: PAIN MEDICINE | Facility: CLINIC | Age: 40
End: 2025-09-02
Payer: MEDICARE

## 2025-09-02 VITALS
WEIGHT: 116 LBS | HEART RATE: 72 BPM | HEIGHT: 59 IN | DIASTOLIC BLOOD PRESSURE: 92 MMHG | SYSTOLIC BLOOD PRESSURE: 136 MMHG | OXYGEN SATURATION: 97 % | BODY MASS INDEX: 23.39 KG/M2

## 2025-09-02 DIAGNOSIS — M54.16 LUMBAR RADICULITIS: Primary | ICD-10-CM

## 2025-09-02 DIAGNOSIS — M47.816 LUMBAR FACET ARTHROPATHY: ICD-10-CM

## 2025-09-02 DIAGNOSIS — M54.16 LUMBAR RADICULOPATHY: ICD-10-CM

## 2025-09-02 DIAGNOSIS — M51.362 DEGENERATION OF INTERVERTEBRAL DISC OF LUMBAR REGION WITH DISCOGENIC BACK PAIN AND LOWER EXTREMITY PAIN: ICD-10-CM

## 2025-09-02 PROCEDURE — 99999 PR PBB SHADOW E&M-EST. PATIENT-LVL III: CPT | Mod: PBBFAC,,, | Performed by: ANESTHESIOLOGY

## 2025-09-02 PROCEDURE — 99214 OFFICE O/P EST MOD 30 MIN: CPT | Mod: S$GLB,,, | Performed by: ANESTHESIOLOGY

## 2025-09-02 PROCEDURE — 1159F MED LIST DOCD IN RCRD: CPT | Mod: CPTII,S$GLB,, | Performed by: ANESTHESIOLOGY

## 2025-09-02 PROCEDURE — 1160F RVW MEDS BY RX/DR IN RCRD: CPT | Mod: CPTII,S$GLB,, | Performed by: ANESTHESIOLOGY

## 2025-09-02 PROCEDURE — 3008F BODY MASS INDEX DOCD: CPT | Mod: CPTII,S$GLB,, | Performed by: ANESTHESIOLOGY

## 2025-09-02 PROCEDURE — 3075F SYST BP GE 130 - 139MM HG: CPT | Mod: CPTII,S$GLB,, | Performed by: ANESTHESIOLOGY

## 2025-09-02 PROCEDURE — 3080F DIAST BP >= 90 MM HG: CPT | Mod: CPTII,S$GLB,, | Performed by: ANESTHESIOLOGY
